# Patient Record
Sex: MALE | Race: WHITE | Employment: OTHER | URBAN - NONMETROPOLITAN AREA
[De-identification: names, ages, dates, MRNs, and addresses within clinical notes are randomized per-mention and may not be internally consistent; named-entity substitution may affect disease eponyms.]

---

## 2017-03-17 ENCOUNTER — OFFICE VISIT (OUTPATIENT)
Dept: FAMILY MEDICINE CLINIC | Age: 62
End: 2017-03-17

## 2017-03-17 VITALS
DIASTOLIC BLOOD PRESSURE: 90 MMHG | WEIGHT: 226 LBS | HEART RATE: 76 BPM | HEIGHT: 72 IN | BODY MASS INDEX: 30.61 KG/M2 | SYSTOLIC BLOOD PRESSURE: 160 MMHG

## 2017-03-17 DIAGNOSIS — M10.00 IDIOPATHIC GOUT, UNSPECIFIED CHRONICITY, UNSPECIFIED SITE: ICD-10-CM

## 2017-03-17 DIAGNOSIS — E11.9 TYPE 2 DIABETES MELLITUS WITHOUT COMPLICATION, WITHOUT LONG-TERM CURRENT USE OF INSULIN (HCC): ICD-10-CM

## 2017-03-17 DIAGNOSIS — I10 ESSENTIAL HYPERTENSION: ICD-10-CM

## 2017-03-17 PROCEDURE — 99213 OFFICE O/P EST LOW 20 MIN: CPT | Performed by: FAMILY MEDICINE

## 2017-03-17 RX ORDER — METOPROLOL SUCCINATE 100 MG/1
100 TABLET, EXTENDED RELEASE ORAL DAILY
Qty: 90 TABLET | Refills: 1 | Status: SHIPPED | OUTPATIENT
Start: 2017-03-17 | End: 2017-09-13 | Stop reason: SDUPTHER

## 2017-03-17 RX ORDER — INDOMETHACIN 50 MG/1
50 CAPSULE ORAL 2 TIMES DAILY WITH MEALS
Qty: 30 CAPSULE | Refills: 3 | Status: SHIPPED | OUTPATIENT
Start: 2017-03-17 | End: 2018-02-03 | Stop reason: SDUPTHER

## 2017-03-17 RX ORDER — ATORVASTATIN CALCIUM 40 MG/1
40 TABLET, FILM COATED ORAL DAILY
Qty: 90 TABLET | Refills: 1 | Status: SHIPPED | OUTPATIENT
Start: 2017-03-17 | End: 2017-09-25 | Stop reason: SDUPTHER

## 2017-03-17 RX ORDER — VALSARTAN AND HYDROCHLOROTHIAZIDE 160; 25 MG/1; MG/1
1 TABLET ORAL DAILY
Qty: 90 TABLET | Refills: 1 | Status: SHIPPED | OUTPATIENT
Start: 2017-03-17 | End: 2017-09-13 | Stop reason: SDUPTHER

## 2017-03-17 ASSESSMENT — ENCOUNTER SYMPTOMS
SHORTNESS OF BREATH: 0
ORTHOPNEA: 0
GASTROINTESTINAL NEGATIVE: 1
RESPIRATORY NEGATIVE: 1

## 2017-04-04 LAB
ALBUMIN SERPL-MCNC: 4.1 G/DL (ref 3.2–5.3)
ALK PHOSPHATASE: 81 IU/L (ref 35–121)
ALT SERPL-CCNC: 24 IU/L (ref 5–59)
ANION GAP SERPL CALCULATED.3IONS-SCNC: 10 MMOL/L
AST SERPL-CCNC: 16 IU/L (ref 10–42)
BILIRUB SERPL-MCNC: 0.6 MG/DL (ref 0.2–1.3)
BUN BLDV-MCNC: 16 MG/DL (ref 10–20)
CALCIUM SERPL-MCNC: 9 MG/DL (ref 8.7–10.8)
CHLORIDE BLD-SCNC: 104 MMOL/L (ref 95–111)
CHOLESTEROL/HDL RATIO: 4.1
CHOLESTEROL: 106 MG/DL
CO2: 31 MMOL/L (ref 21–32)
CREAT SERPL-MCNC: 1.1 MG/DL (ref 0.5–1.3)
CREATINE, URINE: 155.7 MG/DL
EGFR AFRICAN AMERICAN: 82
EGFR IF NONAFRICAN AMERICAN: 68
GLUCOSE: 121 MG/DL (ref 70–100)
HDLC SERPL-MCNC: 26 MG/DL (ref 40–60)
LDL CHOLESTEROL CALCULATED: 50 MG/DL
LDL/HDL RATIO: 1.9
MICROALBUMIN/CREAT 24H UR: 4.5 MG/DL
MICROALBUMIN/CREAT UR-RTO: 29 UG/MG
POTASSIUM SERPL-SCNC: 4.2 MMOL/L (ref 3.5–5.4)
SODIUM BLD-SCNC: 141 MMOL/L (ref 134–147)
TOTAL PROTEIN: 6.5 G/DL (ref 5.8–8)
TRIGL SERPL-MCNC: 151 MG/DL
VLDLC SERPL CALC-MCNC: 30 MG/DL

## 2017-04-05 LAB
AVERAGE GLUCOSE: 114 MG/DL (ref 66–114)
HBA1C MFR BLD: 5.6 % (ref 4.2–5.8)

## 2017-04-06 ENCOUNTER — TELEPHONE (OUTPATIENT)
Dept: FAMILY MEDICINE CLINIC | Age: 62
End: 2017-04-06

## 2017-09-13 DIAGNOSIS — I10 ESSENTIAL HYPERTENSION: ICD-10-CM

## 2017-09-13 RX ORDER — VALSARTAN AND HYDROCHLOROTHIAZIDE 160; 25 MG/1; MG/1
TABLET ORAL
Qty: 90 TABLET | Refills: 1 | Status: SHIPPED | OUTPATIENT
Start: 2017-09-13 | End: 2018-03-16 | Stop reason: SDUPTHER

## 2017-09-13 RX ORDER — METOPROLOL SUCCINATE 100 MG/1
TABLET, EXTENDED RELEASE ORAL
Qty: 90 TABLET | Refills: 1 | Status: SHIPPED | OUTPATIENT
Start: 2017-09-13 | End: 2018-03-16 | Stop reason: SDUPTHER

## 2017-09-25 ENCOUNTER — OFFICE VISIT (OUTPATIENT)
Dept: FAMILY MEDICINE CLINIC | Age: 62
End: 2017-09-25
Payer: COMMERCIAL

## 2017-09-25 VITALS
BODY MASS INDEX: 30.42 KG/M2 | WEIGHT: 224.6 LBS | DIASTOLIC BLOOD PRESSURE: 96 MMHG | HEART RATE: 68 BPM | HEIGHT: 72 IN | SYSTOLIC BLOOD PRESSURE: 152 MMHG

## 2017-09-25 DIAGNOSIS — I10 ESSENTIAL HYPERTENSION: ICD-10-CM

## 2017-09-25 DIAGNOSIS — E11.9 TYPE 2 DIABETES MELLITUS WITHOUT COMPLICATION, WITHOUT LONG-TERM CURRENT USE OF INSULIN (HCC): ICD-10-CM

## 2017-09-25 PROCEDURE — 99213 OFFICE O/P EST LOW 20 MIN: CPT | Performed by: FAMILY MEDICINE

## 2017-09-25 RX ORDER — ATORVASTATIN CALCIUM 40 MG/1
40 TABLET, FILM COATED ORAL DAILY
Qty: 90 TABLET | Refills: 1 | Status: SHIPPED | OUTPATIENT
Start: 2017-09-25 | End: 2018-03-16 | Stop reason: SDUPTHER

## 2017-09-25 ASSESSMENT — ENCOUNTER SYMPTOMS
GASTROINTESTINAL NEGATIVE: 1
RESPIRATORY NEGATIVE: 1
SHORTNESS OF BREATH: 0

## 2018-02-03 DIAGNOSIS — M10.00 IDIOPATHIC GOUT, UNSPECIFIED CHRONICITY, UNSPECIFIED SITE: ICD-10-CM

## 2018-02-05 RX ORDER — INDOMETHACIN 50 MG/1
CAPSULE ORAL
Qty: 30 CAPSULE | Refills: 2 | Status: SHIPPED | OUTPATIENT
Start: 2018-02-05 | End: 2019-02-06 | Stop reason: SDUPTHER

## 2018-02-05 NOTE — TELEPHONE ENCOUNTER
lJ Curry called requesting a refill on the following medications:  Requested Prescriptions     Pending Prescriptions Disp Refills    indomethacin (INDOCIN) 50 MG capsule [Pharmacy Med Name: Indomethacin Oral Capsule 50 MG] 30 capsule 2     Sig: TAKE 1 CAPSULE BY MOUTH TWO TIMES A DAY WITH MEALS       Date of last visit: Visit date not found  Date of next visit (if applicable):3/26/2018  Date of last refill: 03/2017  Pharmacy Name: [facility],  [address] [place] [address] Raliegh Simmonds

## 2018-03-16 ENCOUNTER — OFFICE VISIT (OUTPATIENT)
Dept: FAMILY MEDICINE CLINIC | Age: 63
End: 2018-03-16
Payer: COMMERCIAL

## 2018-03-16 VITALS
SYSTOLIC BLOOD PRESSURE: 154 MMHG | WEIGHT: 227.2 LBS | HEART RATE: 68 BPM | BODY MASS INDEX: 30.77 KG/M2 | DIASTOLIC BLOOD PRESSURE: 90 MMHG | HEIGHT: 72 IN

## 2018-03-16 DIAGNOSIS — I10 ESSENTIAL HYPERTENSION: ICD-10-CM

## 2018-03-16 DIAGNOSIS — M10.00 IDIOPATHIC GOUT, UNSPECIFIED CHRONICITY, UNSPECIFIED SITE: ICD-10-CM

## 2018-03-16 DIAGNOSIS — E11.9 TYPE 2 DIABETES MELLITUS WITHOUT COMPLICATION, WITHOUT LONG-TERM CURRENT USE OF INSULIN (HCC): Primary | ICD-10-CM

## 2018-03-16 LAB
ALBUMIN SERPL-MCNC: 4 G/DL (ref 3.5–5.1)
ALP BLD-CCNC: 76 U/L (ref 38–126)
ALT SERPL-CCNC: 35 U/L (ref 11–66)
ANION GAP SERPL CALCULATED.3IONS-SCNC: 17 MEQ/L (ref 8–16)
AST SERPL-CCNC: 18 U/L (ref 5–40)
AVERAGE GLUCOSE: 126 MG/DL (ref 70–126)
BILIRUB SERPL-MCNC: 0.4 MG/DL (ref 0.3–1.2)
BUN BLDV-MCNC: 18 MG/DL (ref 7–22)
CALCIUM SERPL-MCNC: 9.2 MG/DL (ref 8.5–10.5)
CHLORIDE BLD-SCNC: 101 MEQ/L (ref 98–111)
CHOLESTEROL, TOTAL: 128 MG/DL (ref 100–199)
CO2: 24 MEQ/L (ref 23–33)
CREAT SERPL-MCNC: 1 MG/DL (ref 0.4–1.2)
CREATININE, URINE: 80.7 MG/DL
GFR SERPL CREATININE-BSD FRML MDRD: 76 ML/MIN/1.73M2
GLUCOSE BLD-MCNC: 153 MG/DL (ref 70–108)
HBA1C MFR BLD: 6.2 % (ref 4.4–6.4)
HDLC SERPL-MCNC: 26 MG/DL
LDL CHOLESTEROL CALCULATED: 65 MG/DL
MICROALBUMIN UR-MCNC: 3.19 MG/DL
MICROALBUMIN/CREAT UR-RTO: 40 MG/G (ref 0–30)
POTASSIUM SERPL-SCNC: 4 MEQ/L (ref 3.5–5.2)
SODIUM BLD-SCNC: 142 MEQ/L (ref 135–145)
TOTAL PROTEIN: 6.8 G/DL (ref 6.1–8)
TRIGL SERPL-MCNC: 185 MG/DL (ref 0–199)

## 2018-03-16 PROCEDURE — 99213 OFFICE O/P EST LOW 20 MIN: CPT | Performed by: FAMILY MEDICINE

## 2018-03-16 PROCEDURE — 36415 COLL VENOUS BLD VENIPUNCTURE: CPT | Performed by: FAMILY MEDICINE

## 2018-03-16 RX ORDER — METOPROLOL SUCCINATE 100 MG/1
100 TABLET, EXTENDED RELEASE ORAL DAILY
Qty: 90 TABLET | Refills: 1 | Status: SHIPPED | OUTPATIENT
Start: 2018-03-16 | End: 2018-09-17 | Stop reason: SDUPTHER

## 2018-03-16 RX ORDER — VALSARTAN AND HYDROCHLOROTHIAZIDE 160; 25 MG/1; MG/1
1 TABLET ORAL DAILY
Qty: 90 TABLET | Refills: 1 | Status: SHIPPED | OUTPATIENT
Start: 2018-03-16 | End: 2018-09-17 | Stop reason: SDUPTHER

## 2018-03-16 RX ORDER — AMLODIPINE BESYLATE 5 MG/1
5 TABLET ORAL DAILY
Qty: 90 TABLET | Refills: 1 | Status: SHIPPED | OUTPATIENT
Start: 2018-03-16 | End: 2018-09-05 | Stop reason: SDUPTHER

## 2018-03-16 RX ORDER — ATORVASTATIN CALCIUM 40 MG/1
40 TABLET, FILM COATED ORAL DAILY
Qty: 90 TABLET | Refills: 1 | Status: SHIPPED | OUTPATIENT
Start: 2018-03-16 | End: 2018-09-05 | Stop reason: SDUPTHER

## 2018-03-16 ASSESSMENT — ENCOUNTER SYMPTOMS
RESPIRATORY NEGATIVE: 1
GASTROINTESTINAL NEGATIVE: 1
SHORTNESS OF BREATH: 0
ORTHOPNEA: 0

## 2018-03-16 ASSESSMENT — PATIENT HEALTH QUESTIONNAIRE - PHQ9
SUM OF ALL RESPONSES TO PHQ9 QUESTIONS 1 & 2: 0
1. LITTLE INTEREST OR PLEASURE IN DOING THINGS: 0
2. FEELING DOWN, DEPRESSED OR HOPELESS: 0
SUM OF ALL RESPONSES TO PHQ QUESTIONS 1-9: 0

## 2018-03-16 NOTE — PROGRESS NOTES
distal pulses. Exam reveals no gallop and no friction rub. No murmur heard. Pulmonary/Chest: Effort normal and breath sounds normal.   Abdominal: Soft. He exhibits no mass. There is no splenomegaly or hepatomegaly. There is no tenderness. Musculoskeletal: He exhibits no edema or tenderness. Lymphadenopathy:     He has no cervical adenopathy. Neurological: He is alert and oriented to person, place, and time. Ambulatory. No tremors. Skin: Skin is warm and dry. No rash noted. Psychiatric: He has a normal mood and affect. Vitals reviewed. Assessment:      1. Type 2 diabetes mellitus without complication, without long-term current use of insulin ([facility])  metFORMIN (GLUCOPHAGE) 500 MG tablet    atorvastatin (LIPITOR) 40 MG tablet    Comprehensive Metabolic Panel    Lipid Panel    Hemoglobin A1C    Albumin, Random Urine    Comprehensive Metabolic Panel    Lipid Panel    Hemoglobin A1C   2. Idiopathic gout, unspecified chronicity, unspecified site     3. Essential hypertension  valsartan-hydrochlorothiazide (DIOVAN-HCT) 160-25 MG per tablet    metoprolol succinate (TOPROL XL) 100 MG extended release tablet    Comprehensive Metabolic Panel    Lipid Panel    Hemoglobin A1C    Albumin, Random Urine    Comprehensive Metabolic Panel    Lipid Panel    Hemoglobin A1C       Plan: Will plan sleep study when job travel more stable. Add Norvasc. Return in about 6 months (around 9/16/2018) for DM, check-up.     Orders Placed:  Orders Placed This Encounter   Procedures    Comprehensive Metabolic Panel     Standing Status:   Future     Number of Occurrences:   1     Standing Expiration Date:   3/16/2019    Lipid Panel     Standing Status:   Future     Number of Occurrences:   1     Standing Expiration Date:   3/16/2019     Order Specific Question:   Is Patient Fasting?/# of Hours     Answer:   12 hours    Hemoglobin A1C     Standing Status:   Future     Number of Occurrences:   1     Standing Expiration Date:   3/16/2019     Medications Prescribed:  Orders Placed This Encounter   Medications    metFORMIN (GLUCOPHAGE) 500 MG tablet     Sig: Take 1 tablet by mouth 2 times daily (with meals)     Dispense:  180 tablet     Refill:  1    atorvastatin (LIPITOR) 40 MG tablet     Sig: Take 1 tablet by mouth daily     Dispense:  90 tablet     Refill:  1    valsartan-hydrochlorothiazide (DIOVAN-HCT) 160-25 MG per tablet     Sig: Take 1 tablet by mouth daily     Dispense:  90 tablet     Refill:  1    metoprolol succinate (TOPROL XL) 100 MG extended release tablet     Sig: Take 1 tablet by mouth daily     Dispense:  90 tablet     Refill:  1    amLODIPine (NORVASC) 5 MG tablet     Sig: Take 1 tablet by mouth daily     Dispense:  90 tablet     Refill:  1         Electronically signed by Jody Jacob MD on 3/16/2018 at 9:10 AM

## 2018-03-19 ENCOUNTER — TELEPHONE (OUTPATIENT)
Dept: FAMILY MEDICINE CLINIC | Age: 63
End: 2018-03-19

## 2018-08-14 ENCOUNTER — OFFICE VISIT (OUTPATIENT)
Dept: FAMILY MEDICINE CLINIC | Age: 63
End: 2018-08-14
Payer: COMMERCIAL

## 2018-08-14 VITALS
SYSTOLIC BLOOD PRESSURE: 138 MMHG | HEART RATE: 88 BPM | BODY MASS INDEX: 29.08 KG/M2 | TEMPERATURE: 97.9 F | DIASTOLIC BLOOD PRESSURE: 88 MMHG | WEIGHT: 214.4 LBS

## 2018-08-14 DIAGNOSIS — E11.9 TYPE 2 DIABETES MELLITUS WITHOUT COMPLICATION, WITHOUT LONG-TERM CURRENT USE OF INSULIN (HCC): ICD-10-CM

## 2018-08-14 DIAGNOSIS — L03.115 CELLULITIS OF RIGHT LOWER LEG: Primary | ICD-10-CM

## 2018-08-14 PROCEDURE — 99213 OFFICE O/P EST LOW 20 MIN: CPT | Performed by: FAMILY MEDICINE

## 2018-08-14 RX ORDER — CEPHALEXIN 500 MG/1
500 CAPSULE ORAL 3 TIMES DAILY
Qty: 30 CAPSULE | Refills: 0 | Status: SHIPPED | OUTPATIENT
Start: 2018-08-14 | End: 2018-09-17 | Stop reason: ALTCHOICE

## 2018-08-14 RX ORDER — SULFAMETHOXAZOLE AND TRIMETHOPRIM 800; 160 MG/1; MG/1
1 TABLET ORAL 2 TIMES DAILY
Qty: 20 TABLET | Refills: 0 | Status: SHIPPED | OUTPATIENT
Start: 2018-08-14 | End: 2018-08-24

## 2018-08-14 ASSESSMENT — ENCOUNTER SYMPTOMS
SHORTNESS OF BREATH: 0
WHEEZING: 0

## 2018-08-14 NOTE — PROGRESS NOTES
[facility] PROFESSIONAL SERVS  [facility]  [address] [address]  Dept: [phone]  Dept Fax: [phone]  Loc: [phone]  PROGRESS NOTE      Visit Date: 8/14/2018    rEica Kraft is a 58 y.o. male who presents today for:  Chief Complaint   Patient presents with    Rash     right leg, started after he had rubbed open a small area with new boots    Edema     right leg    Chills     Friday night       Subjective:  HPI     Right lower leg for about 4-5 days. He rubbed open a small scab that he got from his new boots. He has had worsening redness. He denies fevers  chills only on 1 day. He reports just a bit of numbness in his great toe. Diabetes: Fasting glucose levels are good and running about 120-150. Review of Systems   Constitutional: Negative for fever. Respiratory: Negative for shortness of breath and wheezing. Past Medical History:   Diagnosis Date    Diabetes mellitus ([address]     Gout     Hyperlipidemia     Hypertension     Type II or unspecified type diabetes mellitus without mention of complication, not stated as uncontrolled       Current Outpatient Prescriptions   Medication Sig Dispense Refill    metFORMIN (GLUCOPHAGE) 500 MG tablet Take 1 tablet by mouth 2 times daily (with meals) 180 tablet 1    atorvastatin (LIPITOR) 40 MG tablet Take 1 tablet by mouth daily 90 tablet 1    valsartan-hydrochlorothiazide (DIOVAN-HCT) 160-25 MG per tablet Take 1 tablet by mouth daily 90 tablet 1    metoprolol succinate (TOPROL XL) 100 MG extended release tablet Take 1 tablet by mouth daily 90 tablet 1    amLODIPine (NORVASC) 5 MG tablet Take 1 tablet by mouth daily 90 tablet 1    indomethacin (INDOCIN) 50 MG capsule TAKE 1 CAPSULE BY MOUTH TWO TIMES A DAY WITH MEALS 30 capsule 2    aspirin 81 MG tablet Take 1 tablet by mouth daily. 1 tablet 0     No current facility-administered medications for this visit.       Allergies Allergen Reactions    Crestor [Rosuvastatin] Other (See Comments)     Decreased HDL    Pcn [Penicillins] Rash       Objective:     /88 (Site: Left Arm, Position: Sitting, Cuff Size: Medium Adult)   Pulse 88   Temp 97.9 °F (36.6 °C) (Oral)   Wt 214 lb 6.4 oz (97.3 kg)   BMI 29.08 kg/m²   Physical Exam   Constitutional: He is oriented to person, place, and time. He appears well-developed and well-nourished. No distress. Cardiovascular: Normal rate and regular rhythm. No murmur heard. Pulmonary/Chest: Effort normal and breath sounds normal. No respiratory distress. He has no wheezes. Neurological: He is alert and oriented to person, place, and time. Psychiatric: He has a normal mood and affect. His behavior is normal.   Vitals reviewed. Right lower leg: Erythema of the entire anterior shin. One small open skin lesion. No drainage. No fluctuance of the soft tissues. Distal pulses and sensation are intact to light touch. Calf is soft and nontender. Negative Homans sign. Impression/Plan:  1. Cellulitis of right lower leg  Cellulitis of right lower leg. No evidence of abscess. No systemic symptoms. We will treat with Bactrim and Keflex for broad bacterial coverage.  -Return in 2 days if not improving or progresses. - sulfamethoxazole-trimethoprim (BACTRIM DS) 800-160 MG per tablet; Take 1 tablet by mouth 2 times daily for 10 days  Dispense: 20 tablet; Refill: 0  - cephALEXin (KEFLEX) 500 MG capsule; Take 1 capsule by mouth 3 times daily  Dispense: 30 capsule; Refill: 0    2. Type 2 diabetes mellitus without complication, without long-term current use of insulin ([facility])  Controlled glucose levels. They voiced understanding. All questions answered. They agreed with treatment plan. See patient instructions for any educational materials that may have been given. Discussed use, benefit, and side effects of prescribed medications.        (Please note that portions of this note may have been completed with a voice recognition program.  Efforts were made to edit the dictation but occasionally words are mis-transcribed.)    Return if symptoms worsen or fail to improve.        Electronically signed by Jerson Jakc MD on 8/14/2018 at 8:02 AM

## 2018-09-05 DIAGNOSIS — E11.9 TYPE 2 DIABETES MELLITUS WITHOUT COMPLICATION, WITHOUT LONG-TERM CURRENT USE OF INSULIN (HCC): ICD-10-CM

## 2018-09-05 RX ORDER — AMLODIPINE BESYLATE 5 MG/1
TABLET ORAL
Qty: 30 TABLET | Refills: 0 | Status: SHIPPED | OUTPATIENT
Start: 2018-09-05 | End: 2018-09-17 | Stop reason: SDUPTHER

## 2018-09-05 RX ORDER — ATORVASTATIN CALCIUM 40 MG/1
TABLET, FILM COATED ORAL
Qty: 90 TABLET | Refills: 0 | Status: SHIPPED | OUTPATIENT
Start: 2018-09-05 | End: 2018-09-17 | Stop reason: SDUPTHER

## 2018-09-17 ENCOUNTER — OFFICE VISIT (OUTPATIENT)
Dept: FAMILY MEDICINE CLINIC | Age: 63
End: 2018-09-17
Payer: COMMERCIAL

## 2018-09-17 VITALS
WEIGHT: 218 LBS | SYSTOLIC BLOOD PRESSURE: 154 MMHG | HEART RATE: 78 BPM | DIASTOLIC BLOOD PRESSURE: 88 MMHG | HEIGHT: 72 IN | BODY MASS INDEX: 29.53 KG/M2

## 2018-09-17 DIAGNOSIS — I10 ESSENTIAL HYPERTENSION: ICD-10-CM

## 2018-09-17 DIAGNOSIS — M10.00 IDIOPATHIC GOUT, UNSPECIFIED CHRONICITY, UNSPECIFIED SITE: ICD-10-CM

## 2018-09-17 DIAGNOSIS — E11.9 TYPE 2 DIABETES MELLITUS WITHOUT COMPLICATION, WITHOUT LONG-TERM CURRENT USE OF INSULIN (HCC): ICD-10-CM

## 2018-09-17 PROCEDURE — 99213 OFFICE O/P EST LOW 20 MIN: CPT | Performed by: FAMILY MEDICINE

## 2018-09-17 RX ORDER — METOPROLOL SUCCINATE 100 MG/1
100 TABLET, EXTENDED RELEASE ORAL DAILY
Qty: 90 TABLET | Refills: 1 | Status: SHIPPED | OUTPATIENT
Start: 2018-09-17 | End: 2019-03-18 | Stop reason: SDUPTHER

## 2018-09-17 RX ORDER — VALSARTAN AND HYDROCHLOROTHIAZIDE 160; 25 MG/1; MG/1
1 TABLET ORAL DAILY
Qty: 90 TABLET | Refills: 1 | Status: SHIPPED | OUTPATIENT
Start: 2018-09-17 | End: 2019-03-18 | Stop reason: SDUPTHER

## 2018-09-17 RX ORDER — ATORVASTATIN CALCIUM 40 MG/1
TABLET, FILM COATED ORAL
Qty: 90 TABLET | Refills: 0 | Status: SHIPPED | OUTPATIENT
Start: 2018-09-17 | End: 2019-03-18 | Stop reason: SDUPTHER

## 2018-09-17 RX ORDER — AMLODIPINE BESYLATE 5 MG/1
TABLET ORAL
Qty: 90 TABLET | Refills: 1 | Status: SHIPPED | OUTPATIENT
Start: 2018-09-17 | End: 2019-03-18 | Stop reason: SDUPTHER

## 2018-09-17 ASSESSMENT — ENCOUNTER SYMPTOMS
SHORTNESS OF BREATH: 0
RESPIRATORY NEGATIVE: 1
GASTROINTESTINAL NEGATIVE: 1

## 2018-09-17 NOTE — PROGRESS NOTES
Negative for dysphoric mood. Objective:     BP (!) 154/88 (Site: Left Upper Arm, Position: Sitting, Cuff Size: Large Adult)   Pulse 78   Ht 6' (1.829 m)   Wt 218 lb (98.9 kg)   BMI 29.57 kg/m²     Physical Exam   Constitutional: He is oriented to person, place, and time. He appears well-developed and well-nourished. Neck: Normal range of motion. Neck supple. No thyromegaly present. Cardiovascular: Normal rate, regular rhythm and normal heart sounds. Exam reveals no gallop and no friction rub. No murmur heard. Pulmonary/Chest: Effort normal and breath sounds normal.   Abdominal: Soft. He exhibits no mass. There is no splenomegaly or hepatomegaly. There is no tenderness. Musculoskeletal: He exhibits no edema or tenderness. Lymphadenopathy:     He has no cervical adenopathy. Neurological: He is alert and oriented to person, place, and time. Ambulatory. No tremors. Skin: Skin is warm and dry. No rash noted. Psychiatric: He has a normal mood and affect. Vitals reviewed. Assessment:       Diagnosis Orders   1. Type 2 diabetes mellitus without complication, without long-term current use of insulin ([facility])  metFORMIN (GLUCOPHAGE) 500 MG tablet    atorvastatin (LIPITOR) 40 MG tablet   2. Essential hypertension  valsartan-hydrochlorothiazide (DIOVAN-HCT) 160-25 MG per tablet    metoprolol succinate (TOPROL XL) 100 MG extended release tablet   3. Idiopathic gout, unspecified chronicity, unspecified site         Plan:      Return in about 6 months (around 3/17/2019) for HTN, check-up. Orders Placed:  No orders of the defined types were placed in this encounter.     Medications Prescribed:  Orders Placed This Encounter   Medications    metFORMIN (GLUCOPHAGE) 500 MG tablet     Sig: TAKE 1 TABLET BY MOUTH TWO TIMES A DAY WITH MEALS     Dispense:  180 tablet     Refill:  0    atorvastatin (LIPITOR) 40 MG tablet     Sig: TAKE 1 TABLET BY MOUTH ONE TIME A DAY     Dispense:  90 tablet Refill:  0    amLODIPine (NORVASC) 5 MG tablet     Sig: TAKE 1 TABLET BY MOUTH ONE TIME A DAY     Dispense:  90 tablet     Refill:  1    valsartan-hydrochlorothiazide (DIOVAN-HCT) 160-25 MG per tablet     Sig: Take 1 tablet by mouth daily     Dispense:  90 tablet     Refill:  1    metoprolol succinate (TOPROL XL) 100 MG extended release tablet     Sig: Take 1 tablet by mouth daily     Dispense:  90 tablet     Refill:  1            Electronically signed by Fariba Calix MD on 9/17/2018 at 8:22 AM

## 2019-02-06 DIAGNOSIS — M10.00 IDIOPATHIC GOUT, UNSPECIFIED CHRONICITY, UNSPECIFIED SITE: ICD-10-CM

## 2019-02-06 RX ORDER — INDOMETHACIN 50 MG/1
CAPSULE ORAL
Qty: 180 CAPSULE | Refills: 1 | Status: SHIPPED | OUTPATIENT
Start: 2019-02-06 | End: 2019-09-27 | Stop reason: SDUPTHER

## 2019-03-18 ENCOUNTER — OFFICE VISIT (OUTPATIENT)
Dept: FAMILY MEDICINE CLINIC | Age: 64
End: 2019-03-18
Payer: COMMERCIAL

## 2019-03-18 ENCOUNTER — HOSPITAL ENCOUNTER (OUTPATIENT)
Age: 64
Discharge: HOME | End: 2019-03-18
Payer: COMMERCIAL

## 2019-03-18 VITALS
DIASTOLIC BLOOD PRESSURE: 92 MMHG | HEART RATE: 70 BPM | BODY MASS INDEX: 30.8 KG/M2 | WEIGHT: 227.4 LBS | SYSTOLIC BLOOD PRESSURE: 160 MMHG | HEIGHT: 72 IN

## 2019-03-18 DIAGNOSIS — I10 ESSENTIAL HYPERTENSION: ICD-10-CM

## 2019-03-18 DIAGNOSIS — E11.9 TYPE 2 DIABETES MELLITUS WITHOUT COMPLICATION, WITHOUT LONG-TERM CURRENT USE OF INSULIN (HCC): ICD-10-CM

## 2019-03-18 LAB
ALBUMIN SERPL-MCNC: 4.2 G/DL (ref 3.5–5.1)
ALP BLD-CCNC: 79 U/L (ref 38–126)
ALT SERPL-CCNC: 44 U/L (ref 11–66)
ANION GAP SERPL CALCULATED.3IONS-SCNC: 14 MEQ/L (ref 8–16)
AST SERPL-CCNC: 25 U/L (ref 5–40)
AVERAGE GLUCOSE: 132 MG/DL (ref 70–126)
BILIRUB SERPL-MCNC: 0.4 MG/DL (ref 0.3–1.2)
BUN BLDV-MCNC: 18 MG/DL (ref 7–22)
CALCIUM SERPL-MCNC: 10.1 MG/DL (ref 8.5–10.5)
CHLORIDE BLD-SCNC: 105 MEQ/L (ref 98–111)
CHOLESTEROL, TOTAL: 140 MG/DL (ref 100–199)
CO2: 24 MEQ/L (ref 23–33)
CREAT SERPL-MCNC: 1 MG/DL (ref 0.4–1.2)
EKG ATRIAL RATE: 59 BPM
EKG P AXIS: 8 DEGREES
EKG P-R INTERVAL: 154 MS
EKG Q-T INTERVAL: 386 MS
EKG QRS DURATION: 90 MS
EKG QTC CALCULATION (BAZETT): 382 MS
EKG R AXIS: 11 DEGREES
EKG T AXIS: 38 DEGREES
EKG VENTRICULAR RATE: 59 BPM
GFR SERPL CREATININE-BSD FRML MDRD: 75 ML/MIN/1.73M2
GLUCOSE BLD-MCNC: 163 MG/DL (ref 70–108)
HBA1C MFR BLD: 6.4 % (ref 4.4–6.4)
HDLC SERPL-MCNC: 27 MG/DL
LDL CHOLESTEROL CALCULATED: 76 MG/DL
POTASSIUM SERPL-SCNC: 4.1 MEQ/L (ref 3.5–5.2)
SODIUM BLD-SCNC: 143 MEQ/L (ref 135–145)
TOTAL PROTEIN: 7.3 G/DL (ref 6.1–8)
TRIGL SERPL-MCNC: 184 MG/DL (ref 0–199)

## 2019-03-18 PROCEDURE — 93005 ELECTROCARDIOGRAM TRACING: CPT | Performed by: FAMILY MEDICINE

## 2019-03-18 PROCEDURE — 99213 OFFICE O/P EST LOW 20 MIN: CPT | Performed by: FAMILY MEDICINE

## 2019-03-18 PROCEDURE — 93010 ELECTROCARDIOGRAM REPORT: CPT | Performed by: INTERNAL MEDICINE

## 2019-03-18 RX ORDER — ATORVASTATIN CALCIUM 40 MG/1
TABLET, FILM COATED ORAL
Qty: 90 TABLET | Refills: 0 | Status: SHIPPED | OUTPATIENT
Start: 2019-03-18 | End: 2019-06-17 | Stop reason: SDUPTHER

## 2019-03-18 RX ORDER — AMLODIPINE BESYLATE 5 MG/1
TABLET ORAL
Qty: 90 TABLET | Refills: 1 | Status: SHIPPED | OUTPATIENT
Start: 2019-03-18 | End: 2019-09-27 | Stop reason: SDUPTHER

## 2019-03-18 RX ORDER — METOPROLOL SUCCINATE 100 MG/1
100 TABLET, EXTENDED RELEASE ORAL DAILY
Qty: 90 TABLET | Refills: 1 | Status: SHIPPED | OUTPATIENT
Start: 2019-03-18 | End: 2019-09-27 | Stop reason: SDUPTHER

## 2019-03-18 RX ORDER — VALSARTAN AND HYDROCHLOROTHIAZIDE 160; 25 MG/1; MG/1
1 TABLET ORAL DAILY
Qty: 90 TABLET | Refills: 1 | Status: SHIPPED | OUTPATIENT
Start: 2019-03-18 | End: 2019-09-27 | Stop reason: SDUPTHER

## 2019-03-18 ASSESSMENT — PATIENT HEALTH QUESTIONNAIRE - PHQ9
SUM OF ALL RESPONSES TO PHQ9 QUESTIONS 1 & 2: 0
SUM OF ALL RESPONSES TO PHQ QUESTIONS 1-9: 0
SUM OF ALL RESPONSES TO PHQ QUESTIONS 1-9: 0
1. LITTLE INTEREST OR PLEASURE IN DOING THINGS: 0
2. FEELING DOWN, DEPRESSED OR HOPELESS: 0

## 2019-03-18 ASSESSMENT — ENCOUNTER SYMPTOMS
RESPIRATORY NEGATIVE: 1
GASTROINTESTINAL NEGATIVE: 1
ORTHOPNEA: 0

## 2019-03-19 ENCOUNTER — TELEPHONE (OUTPATIENT)
Dept: FAMILY MEDICINE CLINIC | Age: 64
End: 2019-03-19

## 2019-06-17 DIAGNOSIS — E11.9 TYPE 2 DIABETES MELLITUS WITHOUT COMPLICATION, WITHOUT LONG-TERM CURRENT USE OF INSULIN (HCC): ICD-10-CM

## 2019-06-17 RX ORDER — ATORVASTATIN CALCIUM 40 MG/1
TABLET, FILM COATED ORAL
Qty: 90 TABLET | Refills: 0 | Status: SHIPPED | OUTPATIENT
Start: 2019-06-17 | End: 2019-09-27 | Stop reason: SDUPTHER

## 2019-09-27 ENCOUNTER — OFFICE VISIT (OUTPATIENT)
Dept: FAMILY MEDICINE CLINIC | Age: 64
End: 2019-09-27
Payer: COMMERCIAL

## 2019-09-27 VITALS
HEIGHT: 72 IN | BODY MASS INDEX: 29.23 KG/M2 | DIASTOLIC BLOOD PRESSURE: 80 MMHG | WEIGHT: 215.8 LBS | SYSTOLIC BLOOD PRESSURE: 136 MMHG | HEART RATE: 63 BPM

## 2019-09-27 DIAGNOSIS — M10.00 IDIOPATHIC GOUT, UNSPECIFIED CHRONICITY, UNSPECIFIED SITE: ICD-10-CM

## 2019-09-27 DIAGNOSIS — I10 ESSENTIAL HYPERTENSION: ICD-10-CM

## 2019-09-27 DIAGNOSIS — E11.9 TYPE 2 DIABETES MELLITUS WITHOUT COMPLICATION, WITHOUT LONG-TERM CURRENT USE OF INSULIN (HCC): ICD-10-CM

## 2019-09-27 PROCEDURE — 99213 OFFICE O/P EST LOW 20 MIN: CPT | Performed by: FAMILY MEDICINE

## 2019-09-27 RX ORDER — METOPROLOL SUCCINATE 100 MG/1
100 TABLET, EXTENDED RELEASE ORAL DAILY
Qty: 90 TABLET | Refills: 3 | Status: SHIPPED | OUTPATIENT
Start: 2019-09-27 | End: 2020-03-17 | Stop reason: SDUPTHER

## 2019-09-27 RX ORDER — ATORVASTATIN CALCIUM 40 MG/1
40 TABLET, FILM COATED ORAL DAILY
Qty: 90 TABLET | Refills: 3 | Status: SHIPPED | OUTPATIENT
Start: 2019-09-27 | End: 2020-03-17 | Stop reason: SDUPTHER

## 2019-09-27 RX ORDER — VALSARTAN AND HYDROCHLOROTHIAZIDE 160; 25 MG/1; MG/1
1 TABLET ORAL DAILY
Qty: 90 TABLET | Refills: 3 | Status: SHIPPED | OUTPATIENT
Start: 2019-09-27 | End: 2020-03-17 | Stop reason: SDUPTHER

## 2019-09-27 RX ORDER — AMLODIPINE BESYLATE 5 MG/1
TABLET ORAL
Qty: 90 TABLET | Refills: 3 | Status: SHIPPED | OUTPATIENT
Start: 2019-09-27 | End: 2020-03-17 | Stop reason: SDUPTHER

## 2019-09-27 RX ORDER — INDOMETHACIN 50 MG/1
CAPSULE ORAL
Qty: 180 CAPSULE | Refills: 1 | Status: ON HOLD | OUTPATIENT
Start: 2019-09-27 | End: 2019-11-05 | Stop reason: HOSPADM

## 2019-09-27 ASSESSMENT — ENCOUNTER SYMPTOMS
SHORTNESS OF BREATH: 0
ORTHOPNEA: 0
RESPIRATORY NEGATIVE: 1
GASTROINTESTINAL NEGATIVE: 1

## 2019-11-05 ENCOUNTER — ANESTHESIA (OUTPATIENT)
Dept: ENDOSCOPY | Age: 64
End: 2019-11-05
Payer: COMMERCIAL

## 2019-11-05 ENCOUNTER — HOSPITAL ENCOUNTER (OUTPATIENT)
Age: 64
Setting detail: OUTPATIENT SURGERY
Discharge: HOME | End: 2019-11-05
Attending: INTERNAL MEDICINE | Admitting: INTERNAL MEDICINE
Payer: COMMERCIAL

## 2019-11-05 ENCOUNTER — ANESTHESIA EVENT (OUTPATIENT)
Dept: ENDOSCOPY | Age: 64
End: 2019-11-05
Payer: COMMERCIAL

## 2019-11-05 VITALS
OXYGEN SATURATION: 98 % | WEIGHT: 214.4 LBS | HEIGHT: 72 IN | TEMPERATURE: 97.2 F | SYSTOLIC BLOOD PRESSURE: 179 MMHG | RESPIRATION RATE: 20 BRPM | HEART RATE: 73 BPM | DIASTOLIC BLOOD PRESSURE: 95 MMHG | BODY MASS INDEX: 29.04 KG/M2

## 2019-11-05 VITALS
OXYGEN SATURATION: 99 % | DIASTOLIC BLOOD PRESSURE: 104 MMHG | RESPIRATION RATE: 17 BRPM | SYSTOLIC BLOOD PRESSURE: 175 MMHG

## 2019-11-05 PROCEDURE — 2709999900 HC NON-CHARGEABLE SUPPLY: Performed by: INTERNAL MEDICINE

## 2019-11-05 PROCEDURE — 7100000000 HC PACU RECOVERY - FIRST 15 MIN: Performed by: INTERNAL MEDICINE

## 2019-11-05 PROCEDURE — 88305 TISSUE EXAM BY PATHOLOGIST: CPT

## 2019-11-05 PROCEDURE — 6360000002 HC RX W HCPCS: Performed by: NURSE ANESTHETIST, CERTIFIED REGISTERED

## 2019-11-05 PROCEDURE — 3700000000 HC ANESTHESIA ATTENDED CARE: Performed by: INTERNAL MEDICINE

## 2019-11-05 PROCEDURE — 3700000001 HC ADD 15 MINUTES (ANESTHESIA): Performed by: INTERNAL MEDICINE

## 2019-11-05 PROCEDURE — 7100000001 HC PACU RECOVERY - ADDTL 15 MIN: Performed by: INTERNAL MEDICINE

## 2019-11-05 PROCEDURE — 3609010600 HC COLONOSCOPY POLYPECTOMY SNARE/COLD BIOPSY: Performed by: INTERNAL MEDICINE

## 2019-11-05 PROCEDURE — 2580000003 HC RX 258: Performed by: INTERNAL MEDICINE

## 2019-11-05 RX ORDER — SODIUM CHLORIDE 450 MG/100ML
INJECTION, SOLUTION INTRAVENOUS CONTINUOUS
Status: DISCONTINUED | OUTPATIENT
Start: 2019-11-05 | End: 2019-11-05 | Stop reason: HOSPADM

## 2019-11-05 RX ORDER — PROPOFOL 10 MG/ML
INJECTION, EMULSION INTRAVENOUS PRN
Status: DISCONTINUED | OUTPATIENT
Start: 2019-11-05 | End: 2019-11-05 | Stop reason: SDUPTHER

## 2019-11-05 RX ADMIN — PROPOFOL 20 MG: 10 INJECTION, EMULSION INTRAVENOUS at 09:30

## 2019-11-05 RX ADMIN — PROPOFOL 70 MG: 10 INJECTION, EMULSION INTRAVENOUS at 09:19

## 2019-11-05 RX ADMIN — PROPOFOL 50 MG: 10 INJECTION, EMULSION INTRAVENOUS at 09:26

## 2019-11-05 RX ADMIN — PROPOFOL 20 MG: 10 INJECTION, EMULSION INTRAVENOUS at 09:20

## 2019-11-05 RX ADMIN — PROPOFOL 50 MG: 10 INJECTION, EMULSION INTRAVENOUS at 09:37

## 2019-11-05 RX ADMIN — PROPOFOL 20 MG: 10 INJECTION, EMULSION INTRAVENOUS at 09:32

## 2019-11-05 RX ADMIN — PROPOFOL 50 MG: 10 INJECTION, EMULSION INTRAVENOUS at 09:34

## 2019-11-05 RX ADMIN — SODIUM CHLORIDE: 4.5 INJECTION, SOLUTION INTRAVENOUS at 07:41

## 2019-11-05 RX ADMIN — PROPOFOL 30 MG: 10 INJECTION, EMULSION INTRAVENOUS at 09:22

## 2019-11-05 RX ADMIN — PROPOFOL 20 MG: 10 INJECTION, EMULSION INTRAVENOUS at 09:28

## 2019-11-05 RX ADMIN — PROPOFOL 30 MG: 10 INJECTION, EMULSION INTRAVENOUS at 09:24

## 2019-11-05 ASSESSMENT — PAIN SCALES - GENERAL
PAINLEVEL_OUTOF10: 0
PAINLEVEL_OUTOF10: 0

## 2019-11-05 ASSESSMENT — PAIN - FUNCTIONAL ASSESSMENT: PAIN_FUNCTIONAL_ASSESSMENT: 0-10

## 2020-03-17 ENCOUNTER — TELEPHONE (OUTPATIENT)
Dept: FAMILY MEDICINE CLINIC | Age: 65
End: 2020-03-17

## 2020-03-17 RX ORDER — ATORVASTATIN CALCIUM 40 MG/1
40 TABLET, FILM COATED ORAL DAILY
Qty: 90 TABLET | Refills: 3 | Status: SHIPPED | OUTPATIENT
Start: 2020-03-17 | End: 2020-03-20 | Stop reason: SDUPTHER

## 2020-03-17 RX ORDER — AMLODIPINE BESYLATE 5 MG/1
TABLET ORAL
Qty: 90 TABLET | Refills: 3 | Status: SHIPPED | OUTPATIENT
Start: 2020-03-17 | End: 2020-03-20 | Stop reason: SDUPTHER

## 2020-03-17 RX ORDER — AMLODIPINE BESYLATE 5 MG/1
TABLET ORAL
Qty: 90 TABLET | Refills: 3 | Status: SHIPPED | OUTPATIENT
Start: 2020-03-17 | End: 2020-03-17 | Stop reason: SDUPTHER

## 2020-03-17 RX ORDER — METOPROLOL SUCCINATE 100 MG/1
100 TABLET, EXTENDED RELEASE ORAL DAILY
Qty: 90 TABLET | Refills: 3 | Status: SHIPPED | OUTPATIENT
Start: 2020-03-17 | End: 2020-03-20 | Stop reason: SDUPTHER

## 2020-03-17 RX ORDER — ATORVASTATIN CALCIUM 40 MG/1
40 TABLET, FILM COATED ORAL DAILY
Qty: 90 TABLET | Refills: 3 | Status: SHIPPED | OUTPATIENT
Start: 2020-03-17 | End: 2020-03-17 | Stop reason: SDUPTHER

## 2020-03-17 RX ORDER — VALSARTAN AND HYDROCHLOROTHIAZIDE 160; 25 MG/1; MG/1
1 TABLET ORAL DAILY
Qty: 90 TABLET | Refills: 3 | Status: SHIPPED | OUTPATIENT
Start: 2020-03-17 | End: 2020-03-20 | Stop reason: SDUPTHER

## 2020-03-17 NOTE — TELEPHONE ENCOUNTER
Darrell Gibbs. called requesting a refill on the following medications:  Requested Prescriptions     Pending Prescriptions Disp Refills    metFORMIN (GLUCOPHAGE) 500 MG tablet 180 tablet 3     Sig: Take 1 tablet by mouth 2 times daily (with meals)    atorvastatin (LIPITOR) 40 MG tablet 90 tablet 3     Sig: Take 1 tablet by mouth daily    amLODIPine (NORVASC) 5 MG tablet 90 tablet 3     Sig: TAKE 1 TABLET BY MOUTH ONE TIME A DAY       Date of last visit: 9/27/2019  Date of next visit (if applicable):Visit date not found  Date of last refill: 09/27/2019  Pharmacy Name: [facility] has changed to [address] for med refills.

## 2020-03-17 NOTE — TELEPHONE ENCOUNTER
Barbie called back in stating now that he needs the RX's sent in to [facility] instead of the [place] [id number]. I have them set for you to approve.

## 2020-03-20 RX ORDER — ATORVASTATIN CALCIUM 40 MG/1
40 TABLET, FILM COATED ORAL DAILY
Qty: 90 TABLET | Refills: 3 | Status: CANCELLED | OUTPATIENT
Start: 2020-03-20

## 2020-03-20 RX ORDER — AMLODIPINE BESYLATE 5 MG/1
TABLET ORAL
Qty: 90 TABLET | Refills: 3 | Status: SHIPPED | OUTPATIENT
Start: 2020-03-20 | End: 2020-11-16 | Stop reason: SDUPTHER

## 2020-03-20 RX ORDER — METOPROLOL SUCCINATE 100 MG/1
100 TABLET, EXTENDED RELEASE ORAL DAILY
Qty: 90 TABLET | Refills: 3 | Status: SHIPPED | OUTPATIENT
Start: 2020-03-20 | End: 2020-11-16 | Stop reason: SDUPTHER

## 2020-03-20 RX ORDER — VALSARTAN AND HYDROCHLOROTHIAZIDE 160; 25 MG/1; MG/1
1 TABLET ORAL DAILY
Qty: 90 TABLET | Refills: 3 | Status: CANCELLED | OUTPATIENT
Start: 2020-03-20

## 2020-03-20 RX ORDER — AMLODIPINE BESYLATE 5 MG/1
TABLET ORAL
Qty: 90 TABLET | Refills: 3 | Status: CANCELLED | OUTPATIENT
Start: 2020-03-20

## 2020-03-20 RX ORDER — METOPROLOL SUCCINATE 100 MG/1
100 TABLET, EXTENDED RELEASE ORAL DAILY
Qty: 90 TABLET | Refills: 3 | Status: CANCELLED | OUTPATIENT
Start: 2020-03-20

## 2020-03-20 RX ORDER — VALSARTAN AND HYDROCHLOROTHIAZIDE 160; 25 MG/1; MG/1
1 TABLET ORAL DAILY
Qty: 90 TABLET | Refills: 3 | Status: SHIPPED | OUTPATIENT
Start: 2020-03-20 | End: 2020-11-16 | Stop reason: SDUPTHER

## 2020-03-20 RX ORDER — ATORVASTATIN CALCIUM 40 MG/1
40 TABLET, FILM COATED ORAL DAILY
Qty: 90 TABLET | Refills: 3 | Status: SHIPPED | OUTPATIENT
Start: 2020-03-20 | End: 2020-11-16 | Stop reason: SDUPTHER

## 2020-05-28 ENCOUNTER — OFFICE VISIT (OUTPATIENT)
Dept: FAMILY MEDICINE CLINIC | Age: 65
End: 2020-05-28
Payer: COMMERCIAL

## 2020-05-28 VITALS
WEIGHT: 215 LBS | BODY MASS INDEX: 29.12 KG/M2 | TEMPERATURE: 98.7 F | SYSTOLIC BLOOD PRESSURE: 144 MMHG | DIASTOLIC BLOOD PRESSURE: 88 MMHG | HEART RATE: 64 BPM | HEIGHT: 72 IN

## 2020-05-28 PROCEDURE — 99214 OFFICE O/P EST MOD 30 MIN: CPT | Performed by: FAMILY MEDICINE

## 2020-05-28 SDOH — ECONOMIC STABILITY: TRANSPORTATION INSECURITY
IN THE PAST 12 MONTHS, HAS LACK OF TRANSPORTATION KEPT YOU FROM MEETINGS, WORK, OR FROM GETTING THINGS NEEDED FOR DAILY LIVING?: NO

## 2020-05-28 SDOH — ECONOMIC STABILITY: FOOD INSECURITY: WITHIN THE PAST 12 MONTHS, YOU WORRIED THAT YOUR FOOD WOULD RUN OUT BEFORE YOU GOT MONEY TO BUY MORE.: NEVER TRUE

## 2020-05-28 SDOH — ECONOMIC STABILITY: FOOD INSECURITY: WITHIN THE PAST 12 MONTHS, THE FOOD YOU BOUGHT JUST DIDN'T LAST AND YOU DIDN'T HAVE MONEY TO GET MORE.: NEVER TRUE

## 2020-05-28 SDOH — ECONOMIC STABILITY: TRANSPORTATION INSECURITY
IN THE PAST 12 MONTHS, HAS THE LACK OF TRANSPORTATION KEPT YOU FROM MEDICAL APPOINTMENTS OR FROM GETTING MEDICATIONS?: NO

## 2020-05-28 SDOH — ECONOMIC STABILITY: INCOME INSECURITY: HOW HARD IS IT FOR YOU TO PAY FOR THE VERY BASICS LIKE FOOD, HOUSING, MEDICAL CARE, AND HEATING?: NOT HARD AT ALL

## 2020-05-28 ASSESSMENT — PATIENT HEALTH QUESTIONNAIRE - PHQ9
2. FEELING DOWN, DEPRESSED OR HOPELESS: 0
1. LITTLE INTEREST OR PLEASURE IN DOING THINGS: 0
SUM OF ALL RESPONSES TO PHQ QUESTIONS 1-9: 0
SUM OF ALL RESPONSES TO PHQ QUESTIONS 1-9: 0
SUM OF ALL RESPONSES TO PHQ9 QUESTIONS 1 & 2: 0

## 2020-05-28 ASSESSMENT — ENCOUNTER SYMPTOMS
SHORTNESS OF BREATH: 0
COUGH: 0

## 2020-05-30 ENCOUNTER — HOSPITAL ENCOUNTER (OUTPATIENT)
Age: 65
Discharge: HOME | End: 2020-05-30
Payer: COMMERCIAL

## 2020-05-30 DIAGNOSIS — I10 ESSENTIAL HYPERTENSION: ICD-10-CM

## 2020-05-30 DIAGNOSIS — E11.9 TYPE 2 DIABETES MELLITUS WITHOUT COMPLICATION, WITHOUT LONG-TERM CURRENT USE OF INSULIN (HCC): ICD-10-CM

## 2020-05-30 DIAGNOSIS — E78.49 OTHER HYPERLIPIDEMIA: Chronic | ICD-10-CM

## 2020-05-30 LAB
ALBUMIN SERPL-MCNC: 3.9 G/DL (ref 3.5–5.1)
ALP BLD-CCNC: 74 U/L (ref 38–126)
ALT SERPL-CCNC: 33 U/L (ref 11–66)
ANION GAP SERPL CALCULATED.3IONS-SCNC: 12 MEQ/L (ref 8–16)
AST SERPL-CCNC: 26 U/L (ref 5–40)
AVERAGE GLUCOSE: 129 MG/DL (ref 70–126)
BILIRUB SERPL-MCNC: 0.5 MG/DL (ref 0.3–1.2)
BUN BLDV-MCNC: 22 MG/DL (ref 7–22)
CALCIUM SERPL-MCNC: 9 MG/DL (ref 8.5–10.5)
CHLORIDE BLD-SCNC: 104 MEQ/L (ref 98–111)
CHOLESTEROL, TOTAL: 128 MG/DL (ref 100–199)
CO2: 26 MEQ/L (ref 23–33)
CREAT SERPL-MCNC: 1.1 MG/DL (ref 0.4–1.2)
ERYTHROCYTE [DISTWIDTH] IN BLOOD BY AUTOMATED COUNT: 13.2 % (ref 11.5–14.5)
ERYTHROCYTE [DISTWIDTH] IN BLOOD BY AUTOMATED COUNT: 45 FL (ref 35–45)
GFR SERPL CREATININE-BSD FRML MDRD: 67 ML/MIN/1.73M2
GLUCOSE BLD-MCNC: 134 MG/DL (ref 70–108)
HBA1C MFR BLD: 6.3 % (ref 4.4–6.4)
HCT VFR BLD CALC: 43.8 % (ref 42–52)
HDLC SERPL-MCNC: 25 MG/DL
HEMOGLOBIN: 14.7 GM/DL (ref 14–18)
LDL CHOLESTEROL CALCULATED: 63 MG/DL
MCH RBC QN AUTO: 31.6 PG (ref 26–33)
MCHC RBC AUTO-ENTMCNC: 33.6 GM/DL (ref 32.2–35.5)
MCV RBC AUTO: 94.2 FL (ref 80–94)
PLATELET # BLD: 218 THOU/MM3 (ref 130–400)
PMV BLD AUTO: 11.4 FL (ref 9.4–12.4)
POTASSIUM SERPL-SCNC: 3.7 MEQ/L (ref 3.5–5.2)
RBC # BLD: 4.65 MILL/MM3 (ref 4.7–6.1)
SODIUM BLD-SCNC: 142 MEQ/L (ref 135–145)
TOTAL PROTEIN: 6.7 G/DL (ref 6.1–8)
TRIGL SERPL-MCNC: 202 MG/DL (ref 0–199)
WBC # BLD: 6.1 THOU/MM3 (ref 4.8–10.8)

## 2020-05-30 PROCEDURE — 36415 COLL VENOUS BLD VENIPUNCTURE: CPT

## 2020-05-30 PROCEDURE — 85027 COMPLETE CBC AUTOMATED: CPT

## 2020-05-30 PROCEDURE — 83036 HEMOGLOBIN GLYCOSYLATED A1C: CPT

## 2020-05-30 PROCEDURE — 80061 LIPID PANEL: CPT

## 2020-05-30 PROCEDURE — 80053 COMPREHEN METABOLIC PANEL: CPT

## 2020-06-01 ENCOUNTER — TELEPHONE (OUTPATIENT)
Dept: FAMILY MEDICINE CLINIC | Age: 65
End: 2020-06-01

## 2020-11-16 ENCOUNTER — HOSPITAL ENCOUNTER (OUTPATIENT)
Age: 65
Discharge: HOME | End: 2020-11-16
Payer: MEDICARE

## 2020-11-16 ENCOUNTER — OFFICE VISIT (OUTPATIENT)
Dept: FAMILY MEDICINE CLINIC | Age: 65
End: 2020-11-16
Payer: MEDICARE

## 2020-11-16 VITALS
BODY MASS INDEX: 29.58 KG/M2 | DIASTOLIC BLOOD PRESSURE: 88 MMHG | TEMPERATURE: 97 F | OXYGEN SATURATION: 97 % | HEIGHT: 72 IN | SYSTOLIC BLOOD PRESSURE: 140 MMHG | HEART RATE: 72 BPM | WEIGHT: 218.4 LBS

## 2020-11-16 DIAGNOSIS — E11.9 TYPE 2 DIABETES MELLITUS WITHOUT COMPLICATION, WITHOUT LONG-TERM CURRENT USE OF INSULIN (HCC): ICD-10-CM

## 2020-11-16 DIAGNOSIS — Z12.5 PROSTATE CANCER SCREENING: ICD-10-CM

## 2020-11-16 DIAGNOSIS — I10 ESSENTIAL HYPERTENSION: ICD-10-CM

## 2020-11-16 LAB
ALBUMIN SERPL-MCNC: 4.4 G/DL (ref 3.5–5.1)
ALP BLD-CCNC: 81 U/L (ref 38–126)
ALT SERPL-CCNC: 31 U/L (ref 11–66)
ANION GAP SERPL CALCULATED.3IONS-SCNC: 16 MEQ/L (ref 8–16)
AST SERPL-CCNC: 24 U/L (ref 5–40)
AVERAGE GLUCOSE: 150 MG/DL (ref 70–126)
BILIRUB SERPL-MCNC: 0.6 MG/DL (ref 0.3–1.2)
BUN BLDV-MCNC: 16 MG/DL (ref 7–22)
CALCIUM SERPL-MCNC: 9.9 MG/DL (ref 8.5–10.5)
CHLORIDE BLD-SCNC: 101 MEQ/L (ref 98–111)
CO2: 25 MEQ/L (ref 23–33)
CREAT SERPL-MCNC: 1.1 MG/DL (ref 0.4–1.2)
ERYTHROCYTE [DISTWIDTH] IN BLOOD BY AUTOMATED COUNT: 12.9 % (ref 11.5–14.5)
ERYTHROCYTE [DISTWIDTH] IN BLOOD BY AUTOMATED COUNT: 43.8 FL (ref 35–45)
GFR SERPL CREATININE-BSD FRML MDRD: 67 ML/MIN/1.73M2
GLUCOSE BLD-MCNC: 144 MG/DL (ref 70–108)
HBA1C MFR BLD: 7 % (ref 4.4–6.4)
HCT VFR BLD CALC: 44.9 % (ref 42–52)
HEMOGLOBIN: 15.2 GM/DL (ref 14–18)
MCH RBC QN AUTO: 32 PG (ref 26–33)
MCHC RBC AUTO-ENTMCNC: 33.9 GM/DL (ref 32.2–35.5)
MCV RBC AUTO: 94.5 FL (ref 80–94)
PLATELET # BLD: 255 THOU/MM3 (ref 130–400)
PMV BLD AUTO: 11.3 FL (ref 9.4–12.4)
POTASSIUM SERPL-SCNC: 3.5 MEQ/L (ref 3.5–5.2)
PROSTATE SPECIFIC ANTIGEN: 1.06 NG/ML (ref 0–1)
RBC # BLD: 4.75 MILL/MM3 (ref 4.7–6.1)
SODIUM BLD-SCNC: 142 MEQ/L (ref 135–145)
TOTAL PROTEIN: 7.5 G/DL (ref 6.1–8)
WBC # BLD: 6.6 THOU/MM3 (ref 4.8–10.8)

## 2020-11-16 PROCEDURE — 1036F TOBACCO NON-USER: CPT | Performed by: FAMILY MEDICINE

## 2020-11-16 PROCEDURE — 83036 HEMOGLOBIN GLYCOSYLATED A1C: CPT

## 2020-11-16 PROCEDURE — 3017F COLORECTAL CA SCREEN DOC REV: CPT | Performed by: FAMILY MEDICINE

## 2020-11-16 PROCEDURE — 85027 COMPLETE CBC AUTOMATED: CPT

## 2020-11-16 PROCEDURE — 80053 COMPREHEN METABOLIC PANEL: CPT

## 2020-11-16 PROCEDURE — 90694 VACC AIIV4 NO PRSRV 0.5ML IM: CPT | Performed by: FAMILY MEDICINE

## 2020-11-16 PROCEDURE — 4040F PNEUMOC VAC/ADMIN/RCVD: CPT | Performed by: FAMILY MEDICINE

## 2020-11-16 PROCEDURE — G8427 DOCREV CUR MEDS BY ELIG CLIN: HCPCS | Performed by: FAMILY MEDICINE

## 2020-11-16 PROCEDURE — G8417 CALC BMI ABV UP PARAM F/U: HCPCS | Performed by: FAMILY MEDICINE

## 2020-11-16 PROCEDURE — 3288F FALL RISK ASSESSMENT DOCD: CPT | Performed by: FAMILY MEDICINE

## 2020-11-16 PROCEDURE — 3051F HG A1C>EQUAL 7.0%<8.0%: CPT | Performed by: FAMILY MEDICINE

## 2020-11-16 PROCEDURE — G0103 PSA SCREENING: HCPCS

## 2020-11-16 PROCEDURE — 90732 PPSV23 VACC 2 YRS+ SUBQ/IM: CPT | Performed by: FAMILY MEDICINE

## 2020-11-16 PROCEDURE — 36415 COLL VENOUS BLD VENIPUNCTURE: CPT

## 2020-11-16 PROCEDURE — G0008 ADMIN INFLUENZA VIRUS VAC: HCPCS | Performed by: FAMILY MEDICINE

## 2020-11-16 PROCEDURE — G8484 FLU IMMUNIZE NO ADMIN: HCPCS | Performed by: FAMILY MEDICINE

## 2020-11-16 PROCEDURE — 99214 OFFICE O/P EST MOD 30 MIN: CPT | Performed by: FAMILY MEDICINE

## 2020-11-16 PROCEDURE — 2022F DILAT RTA XM EVC RTNOPTHY: CPT | Performed by: FAMILY MEDICINE

## 2020-11-16 PROCEDURE — 1123F ACP DISCUSS/DSCN MKR DOCD: CPT | Performed by: FAMILY MEDICINE

## 2020-11-16 PROCEDURE — G0009 ADMIN PNEUMOCOCCAL VACCINE: HCPCS | Performed by: FAMILY MEDICINE

## 2020-11-16 RX ORDER — AMLODIPINE BESYLATE 10 MG/1
TABLET ORAL
Qty: 90 TABLET | Refills: 1 | Status: SHIPPED | OUTPATIENT
Start: 2020-11-16 | End: 2021-05-17 | Stop reason: SDUPTHER

## 2020-11-16 RX ORDER — METOPROLOL SUCCINATE 100 MG/1
100 TABLET, EXTENDED RELEASE ORAL DAILY
Qty: 90 TABLET | Refills: 3 | Status: SHIPPED | OUTPATIENT
Start: 2020-11-16 | End: 2021-05-17 | Stop reason: SDUPTHER

## 2020-11-16 RX ORDER — VALSARTAN AND HYDROCHLOROTHIAZIDE 160; 25 MG/1; MG/1
1 TABLET ORAL DAILY
Qty: 90 TABLET | Refills: 3 | Status: SHIPPED | OUTPATIENT
Start: 2020-11-16 | End: 2021-05-17 | Stop reason: SDUPTHER

## 2020-11-16 RX ORDER — ATORVASTATIN CALCIUM 40 MG/1
40 TABLET, FILM COATED ORAL DAILY
Qty: 90 TABLET | Refills: 3 | Status: SHIPPED | OUTPATIENT
Start: 2020-11-16 | End: 2021-05-17 | Stop reason: SDUPTHER

## 2020-11-16 ASSESSMENT — ENCOUNTER SYMPTOMS
SHORTNESS OF BREATH: 0
COUGH: 1

## 2020-11-16 NOTE — PROGRESS NOTES
Packs/day: 2.00     Years: 20.00     Pack years: 40.00     Types: Cigarettes     Last attempt to quit: 1998     Years since quittin.3    Smokeless tobacco: Never Used   Substance Use Topics    Alcohol use: Yes     Comment: occassionally      Current Outpatient Medications   Medication Sig Dispense Refill    metoprolol succinate (TOPROL XL) 100 MG extended release tablet Take 1 tablet by mouth daily 90 tablet 3    valsartan-hydroCHLOROthiazide (DIOVAN-HCT) 160-25 MG per tablet Take 1 tablet by mouth daily 90 tablet 3    amLODIPine (NORVASC) 5 MG tablet TAKE 1 TABLET BY MOUTH ONE TIME A DAY 90 tablet 3    atorvastatin (LIPITOR) 40 MG tablet Take 1 tablet by mouth daily 90 tablet 3    metFORMIN (GLUCOPHAGE) 500 MG tablet Take 1 tablet by mouth 2 times daily (with meals) 180 tablet 3    aspirin 81 MG tablet Take 1 tablet by mouth daily. 1 tablet 0     No current facility-administered medications for this visit.       Allergies   Allergen Reactions    Crestor [Rosuvastatin] Other (See Comments)     Decreased HDL    Pcn [Penicillins] Rash     Health Maintenance   Topic Date Due    AAA screen  1955    Hepatitis C screen  1955    HIV screen  1970    Shingles Vaccine (1 of 2) 2005    Diabetic microalbuminuria test  2019    Diabetic retinal exam  2020    Flu vaccine (1) 2020    Diabetic foot exam  2020    Pneumococcal 65+ years Vaccine (1 of 1 - PPSV23) 2020    A1C test (Diabetic or Prediabetic)  2021    Lipid screen  2021    Potassium monitoring  2021    Creatinine monitoring  2021    DTaP/Tdap/Td vaccine (2 - Td) 2024    Colon cancer screen colonoscopy  2024    Hepatitis A vaccine  Aged Out    Hib vaccine  Aged Out    Meningococcal (ACWY) vaccine  Aged Out       Objective:  BP (!) 144/88 (Site: Right Upper Arm, Position: Sitting)   Pulse 72   Temp 97 °F (36.1 °C)   Ht 6' (1.829 m)   Wt 218 lb 6.4 oz (99.1 kg)   SpO2 97%   BMI 29.62 kg/m²   Physical Exam  Vitals signs reviewed. Constitutional:       General: He is not in acute distress. Appearance: He is well-developed. He is not ill-appearing. Cardiovascular:      Rate and Rhythm: Normal rate and regular rhythm. Heart sounds: No murmur. Pulmonary:      Effort: Pulmonary effort is normal. No respiratory distress. Breath sounds: Normal breath sounds. No wheezing or rhonchi. Musculoskeletal:      Right lower leg: No edema. Left lower leg: No edema. Neurological:      Mental Status: He is alert and oriented to person, place, and time. Mental status is at baseline. Psychiatric:         Mood and Affect: Mood normal.         Behavior: Behavior normal.       Visual inspection:  Deformity/amputation: absent  Skin lesions/pre-ulcerative calluses: absent  Edema: right- negative, left- negative    Sensory exam:  Monofilament sensation: normal  (minimum of 5 random plantar locations tested, avoiding callused areas - > 1 area with absence of sensation is + for neuropathy)    Plus at least one of the following:  Pulses: normal,       Impression/Plan:  1. Type 2 diabetes mellitus without complication, without long-term current use of insulin (HCC)  Chronic. Check status of control with A1c. Refill meds  -  DIABETES FOOT EXAM  - atorvastatin (LIPITOR) 40 MG tablet; Take 1 tablet by mouth daily  Dispense: 90 tablet; Refill: 3  - metFORMIN (GLUCOPHAGE) 500 MG tablet; Take 1 tablet by mouth 2 times daily (with meals)  Dispense: 180 tablet; Refill: 3  - Hemoglobin A1C; Future  - Comprehensive Metabolic Panel; Future  - CBC; Future    2. Essential hypertension  Chronic. Borderline controlled. Refill medications. Nurse visit in 1 week for blood pressure recheck. Check labs  - metoprolol succinate (TOPROL XL) 100 MG extended release tablet; Take 1 tablet by mouth daily  Dispense: 90 tablet;  Refill: 3  - valsartan-hydroCHLOROthiazide

## 2020-11-17 ENCOUNTER — TELEPHONE (OUTPATIENT)
Dept: FAMILY MEDICINE CLINIC | Age: 65
End: 2020-11-17

## 2020-11-17 NOTE — TELEPHONE ENCOUNTER
----- Message from Zoya Leahy MD sent at 11/17/2020  7:19 AM EST -----  CMP is good except mildly elevated glucose. PSA is good. A1c shows diabetes is borderline controlled. CBC is good. No medication changes for diabetes. Follow-up 6 months. Please advise patient.   Zoya Leahy MD

## 2021-05-17 ENCOUNTER — HOSPITAL ENCOUNTER (OUTPATIENT)
Age: 66
Discharge: HOME | End: 2021-05-17
Payer: MEDICARE

## 2021-05-17 ENCOUNTER — OFFICE VISIT (OUTPATIENT)
Dept: FAMILY MEDICINE CLINIC | Age: 66
End: 2021-05-17
Payer: MEDICARE

## 2021-05-17 VITALS
HEIGHT: 72 IN | RESPIRATION RATE: 15 BRPM | SYSTOLIC BLOOD PRESSURE: 138 MMHG | DIASTOLIC BLOOD PRESSURE: 80 MMHG | OXYGEN SATURATION: 98 % | HEART RATE: 58 BPM | TEMPERATURE: 97 F | WEIGHT: 215.8 LBS | BODY MASS INDEX: 29.23 KG/M2

## 2021-05-17 DIAGNOSIS — E78.49 OTHER HYPERLIPIDEMIA: Chronic | ICD-10-CM

## 2021-05-17 DIAGNOSIS — I10 ESSENTIAL HYPERTENSION: ICD-10-CM

## 2021-05-17 DIAGNOSIS — E11.9 TYPE 2 DIABETES MELLITUS WITHOUT COMPLICATION, WITHOUT LONG-TERM CURRENT USE OF INSULIN (HCC): ICD-10-CM

## 2021-05-17 DIAGNOSIS — Z00.00 ROUTINE GENERAL MEDICAL EXAMINATION AT A HEALTH CARE FACILITY: Primary | ICD-10-CM

## 2021-05-17 LAB
ALBUMIN SERPL-MCNC: 4.2 G/DL (ref 3.5–5.1)
ALP BLD-CCNC: 70 U/L (ref 38–126)
ALT SERPL-CCNC: 49 U/L (ref 11–66)
ANION GAP SERPL CALCULATED.3IONS-SCNC: 15 MEQ/L (ref 8–16)
AST SERPL-CCNC: 28 U/L (ref 5–40)
AVERAGE GLUCOSE: 144 MG/DL (ref 70–126)
BILIRUB SERPL-MCNC: 0.5 MG/DL (ref 0.3–1.2)
BUN BLDV-MCNC: 19 MG/DL (ref 7–22)
CALCIUM SERPL-MCNC: 9.5 MG/DL (ref 8.5–10.5)
CHLORIDE BLD-SCNC: 100 MEQ/L (ref 98–111)
CHOLESTEROL, TOTAL: 124 MG/DL (ref 100–199)
CO2: 25 MEQ/L (ref 23–33)
CREAT SERPL-MCNC: 1.2 MG/DL (ref 0.4–1.2)
GFR SERPL CREATININE-BSD FRML MDRD: 61 ML/MIN/1.73M2
GLUCOSE BLD-MCNC: 141 MG/DL (ref 70–108)
HBA1C MFR BLD: 6.8 % (ref 4.4–6.4)
HDLC SERPL-MCNC: 26 MG/DL
LDL CHOLESTEROL CALCULATED: 53 MG/DL
POTASSIUM SERPL-SCNC: 3.8 MEQ/L (ref 3.5–5.2)
SODIUM BLD-SCNC: 140 MEQ/L (ref 135–145)
TOTAL PROTEIN: 7.3 G/DL (ref 6.1–8)
TRIGL SERPL-MCNC: 227 MG/DL (ref 0–199)

## 2021-05-17 PROCEDURE — G0402 INITIAL PREVENTIVE EXAM: HCPCS | Performed by: FAMILY MEDICINE

## 2021-05-17 PROCEDURE — 1123F ACP DISCUSS/DSCN MKR DOCD: CPT | Performed by: FAMILY MEDICINE

## 2021-05-17 PROCEDURE — 83036 HEMOGLOBIN GLYCOSYLATED A1C: CPT

## 2021-05-17 PROCEDURE — 80053 COMPREHEN METABOLIC PANEL: CPT

## 2021-05-17 PROCEDURE — 36415 COLL VENOUS BLD VENIPUNCTURE: CPT

## 2021-05-17 PROCEDURE — 80061 LIPID PANEL: CPT

## 2021-05-17 PROCEDURE — 3017F COLORECTAL CA SCREEN DOC REV: CPT | Performed by: FAMILY MEDICINE

## 2021-05-17 PROCEDURE — 3046F HEMOGLOBIN A1C LEVEL >9.0%: CPT | Performed by: FAMILY MEDICINE

## 2021-05-17 PROCEDURE — 4040F PNEUMOC VAC/ADMIN/RCVD: CPT | Performed by: FAMILY MEDICINE

## 2021-05-17 RX ORDER — VALSARTAN AND HYDROCHLOROTHIAZIDE 160; 25 MG/1; MG/1
1 TABLET ORAL DAILY
Qty: 90 TABLET | Refills: 1 | Status: SHIPPED | OUTPATIENT
Start: 2021-05-17 | End: 2021-11-22 | Stop reason: SDUPTHER

## 2021-05-17 RX ORDER — AMLODIPINE BESYLATE 10 MG/1
TABLET ORAL
Qty: 90 TABLET | Refills: 1 | Status: SHIPPED | OUTPATIENT
Start: 2021-05-17 | End: 2021-11-22 | Stop reason: SDUPTHER

## 2021-05-17 RX ORDER — METOPROLOL SUCCINATE 100 MG/1
100 TABLET, EXTENDED RELEASE ORAL DAILY
Qty: 90 TABLET | Refills: 1 | Status: SHIPPED | OUTPATIENT
Start: 2021-05-17 | End: 2021-11-22 | Stop reason: SDUPTHER

## 2021-05-17 RX ORDER — ATORVASTATIN CALCIUM 40 MG/1
40 TABLET, FILM COATED ORAL DAILY
Qty: 90 TABLET | Refills: 1 | Status: SHIPPED | OUTPATIENT
Start: 2021-05-17 | End: 2021-11-22 | Stop reason: SDUPTHER

## 2021-05-17 ASSESSMENT — PATIENT HEALTH QUESTIONNAIRE - PHQ9
SUM OF ALL RESPONSES TO PHQ QUESTIONS 1-9: 0
SUM OF ALL RESPONSES TO PHQ QUESTIONS 1-9: 0
1. LITTLE INTEREST OR PLEASURE IN DOING THINGS: 0
2. FEELING DOWN, DEPRESSED OR HOPELESS: 0
SUM OF ALL RESPONSES TO PHQ9 QUESTIONS 1 & 2: 0

## 2021-05-17 ASSESSMENT — LIFESTYLE VARIABLES
HAVE YOU OR SOMEONE ELSE BEEN INJURED AS A RESULT OF YOUR DRINKING: 0
HOW OFTEN DO YOU HAVE A DRINK CONTAINING ALCOHOL: 1
HOW OFTEN DURING THE LAST YEAR HAVE YOU NEEDED AN ALCOHOLIC DRINK FIRST THING IN THE MORNING TO GET YOURSELF GOING AFTER A NIGHT OF HEAVY DRINKING: 0
HOW OFTEN DURING THE LAST YEAR HAVE YOU BEEN UNABLE TO REMEMBER WHAT HAPPENED THE NIGHT BEFORE BECAUSE YOU HAD BEEN DRINKING: 0
HAS A RELATIVE, FRIEND, DOCTOR, OR ANOTHER HEALTH PROFESSIONAL EXPRESSED CONCERN ABOUT YOUR DRINKING OR SUGGESTED YOU CUT DOWN: 0
HOW OFTEN DURING THE LAST YEAR HAVE YOU FAILED TO DO WHAT WAS NORMALLY EXPECTED FROM YOU BECAUSE OF DRINKING: 0

## 2021-05-17 NOTE — PROGRESS NOTES
Pressure Mother     Cancer Father        CareTeam (Including outside providers/suppliers regularly involved in providing care):   Patient Care Team:  Lebron Thakur MD as PCP - General (Family Medicine)  Lebron Thakur MD as PCP - [facility]    Wt Readings from Last 3 Encounters:   05/17/21 215 lb 12.8 oz (97.9 kg)   11/16/20 218 lb 6.4 oz (99.1 kg)   05/28/20 215 lb (97.5 kg)     Vitals:    05/17/21 0745   BP: 138/80   Site: Left Upper Arm   Position: Sitting   Pulse: 58   Resp: 15   Temp: 97 °F (36.1 °C)   SpO2: 98%   Weight: 215 lb 12.8 oz (97.9 kg)   Height: 6' (1.829 m)     Body mass index is 29.27 kg/m². Based upon direct observation of the patient, evaluation of cognition reveals recent and remote memory intact. Physical Exam  Vitals reviewed. Constitutional:       General: He is not in acute distress. Appearance: He is well-developed. He is not ill-appearing. Cardiovascular:      Rate and Rhythm: Normal rate and regular rhythm. Heart sounds: No murmur heard. Pulmonary:      Effort: Pulmonary effort is normal. No respiratory distress. Breath sounds: Normal breath sounds. No wheezing or rhonchi. Musculoskeletal:      Right lower leg: No edema. Left lower leg: No edema. Neurological:      Mental Status: He is alert. Mental status is at baseline. Psychiatric:         Mood and Affect: Mood normal.         Behavior: Behavior normal.           Patient's complete Health Risk Assessment and screening values have been reviewed and are found in Flowsheets. The following problems were reviewed today and where indicated follow up appointments were made and/or referrals ordered. Positive Risk Factor Screenings with Interventions:          General Health and ACP:  General  In general, how would you say your health is?: Excellent  In the past 7 days, have you experienced any of the following?  New or Increased Pain, New or Increased Fatigue, Loneliness, Social Isolation, Stress or Anger?: None of These  Do you get the social and emotional support that you need?: Yes  Do you have a Living Will?: Yes  Advance Directives     Power of  Living Will ACP-Advance Directive ACP-Power of     Not on File Not on File Not on File Not on File      General Health Risk Interventions:  · n/a        Personalized Preventive Plan   Current Health Maintenance Status  Immunization History   Administered Date(s) Administered    Hepatitis A 06/25/2014, 01/09/2015    Hepatitis A Adult (Havrix, Vaqta) 06/25/2014, 01/09/2015    Hepatitis B 03/01/2003, 04/01/2003, 09/01/2003    Influenza A (I5Q0-07) Vaccine IM 02/19/2010    Influenza A (H3K4-01) Vaccine PF IM 02/19/2010    Influenza Virus Vaccine 10/15/2016    Influenza, Edson Hum, IM, (6 mo and older Fluzone, Flulaval, Fluarix and 3 yrs and older Afluria) 10/15/2016    Influenza, Quadv, adjuvanted, 65 yrs +, IM, PF (Fluad) 11/16/2020    Japanese Encephalitis Vaccine Sq 12/05/2014, 01/09/2015    Japanese Encephalitis, IM (Ixiaro) 12/05/2014, 01/09/2015    Pneumococcal Polysaccharide ([id number]) 11/16/2020    Tdap (Boostrix, Adacel) 06/25/2014    Typhoid Vi capsular polysaccharide (Typhim VI) 06/25/2014    Yellow Fever (YF-Vax) 06/25/2014        Health Maintenance   Topic Date Due    AAA screen  Never done    Hepatitis C screen  Never done    HIV screen  Never done    Diabetic microalbuminuria test  03/16/2019    Diabetic retinal exam  06/03/2020    Annual Wellness Visit (AWV)  Never done    Lipid screen  05/30/2021    Diabetic foot exam  11/16/2021    A1C test (Diabetic or Prediabetic)  11/16/2021    Potassium monitoring  11/16/2021    Creatinine monitoring  11/16/2021    DTaP/Tdap/Td vaccine (2 - Td) 06/25/2024    Colon cancer screen colonoscopy  11/05/2024    Flu vaccine  Completed    Shingles Vaccine  Completed    Pneumococcal 65+ years Vaccine  Completed    COVID-19 Vaccine  Completed    Hepatitis A vaccine  Aged Out    Hib vaccine  Aged Out    Meningococcal (ACWY) vaccine  Aged Out     Recommendations for IMGuest Due: see orders and patient instructions/AVS.  . Recommended screening schedule for the next 5-10 years is provided to the patient in written form: see Patient Instructions/AVS.    Marilu Higgins was seen today for medicare awv and hypertension. Diagnoses and all orders for this visit:    Routine general medical examination at a health care facility    Type 2 diabetes mellitus without complication, without long-term current use of insulin (HCC)  -     atorvastatin (LIPITOR) 40 MG tablet; Take 1 tablet by mouth daily  -     metFORMIN (GLUCOPHAGE) 500 MG tablet; Take 1 tablet by mouth 2 times daily (with meals)  -     Hemoglobin A1C; Future  -     Comprehensive Metabolic Panel; Future    Essential hypertension  -     metoprolol succinate (TOPROL XL) 100 MG extended release tablet; Take 1 tablet by mouth daily  -     valsartan-hydroCHLOROthiazide (DIOVAN-HCT) 160-25 MG per tablet; Take 1 tablet by mouth daily  -     amLODIPine (NORVASC) 10 MG tablet; TAKE 1 TABLET BY MOUTH ONE TIME A DAY  -     Comprehensive Metabolic Panel; Future    Other hyperlipidemia  -     atorvastatin (LIPITOR) 40 MG tablet; Take 1 tablet by mouth daily  -     Lipid Panel; Future             Above problems are deemed controlled. meds refilled. Check labs. He declines AAA screening.     F/u 6 months DM/ HTN      Yetta Crigler, MD

## 2021-05-18 ENCOUNTER — TELEPHONE (OUTPATIENT)
Dept: FAMILY MEDICINE CLINIC | Age: 66
End: 2021-05-18

## 2021-05-18 NOTE — TELEPHONE ENCOUNTER
----- Message from Miguel Angel Abbasi MD sent at 5/17/2021  8:19 PM EDT -----  A1c shows diabetes is well controlled. Lipids show LDL is great and triglcyerides are higher than previous. CMP is good. Please advise patient.   Miguel Angel Abbasi MD

## 2021-11-22 ENCOUNTER — OFFICE VISIT (OUTPATIENT)
Dept: FAMILY MEDICINE CLINIC | Age: 66
End: 2021-11-22
Payer: MEDICARE

## 2021-11-22 ENCOUNTER — HOSPITAL ENCOUNTER (OUTPATIENT)
Age: 66
Discharge: HOME | End: 2021-11-22
Payer: MEDICARE

## 2021-11-22 VITALS
SYSTOLIC BLOOD PRESSURE: 138 MMHG | HEIGHT: 72 IN | WEIGHT: 219.2 LBS | OXYGEN SATURATION: 98 % | RESPIRATION RATE: 16 BRPM | TEMPERATURE: 97 F | BODY MASS INDEX: 29.69 KG/M2 | DIASTOLIC BLOOD PRESSURE: 78 MMHG | HEART RATE: 59 BPM

## 2021-11-22 DIAGNOSIS — E11.9 TYPE 2 DIABETES MELLITUS WITHOUT COMPLICATION, WITHOUT LONG-TERM CURRENT USE OF INSULIN (HCC): Primary | ICD-10-CM

## 2021-11-22 DIAGNOSIS — L57.0 AK (ACTINIC KERATOSIS): ICD-10-CM

## 2021-11-22 DIAGNOSIS — E78.49 OTHER HYPERLIPIDEMIA: Chronic | ICD-10-CM

## 2021-11-22 DIAGNOSIS — E11.9 TYPE 2 DIABETES MELLITUS WITHOUT COMPLICATION, WITHOUT LONG-TERM CURRENT USE OF INSULIN (HCC): ICD-10-CM

## 2021-11-22 DIAGNOSIS — I10 ESSENTIAL HYPERTENSION: ICD-10-CM

## 2021-11-22 DIAGNOSIS — Z13.6 SCREENING FOR AAA (ABDOMINAL AORTIC ANEURYSM): ICD-10-CM

## 2021-11-22 DIAGNOSIS — Z12.5 PROSTATE CANCER SCREENING: ICD-10-CM

## 2021-11-22 DIAGNOSIS — Z23 FLU VACCINE NEED: ICD-10-CM

## 2021-11-22 LAB
ALBUMIN SERPL-MCNC: 4.2 G/DL (ref 3.5–5.1)
ALP BLD-CCNC: 90 U/L (ref 38–126)
ALT SERPL-CCNC: 27 U/L (ref 11–66)
ANION GAP SERPL CALCULATED.3IONS-SCNC: 15 MEQ/L (ref 8–16)
AST SERPL-CCNC: 23 U/L (ref 5–40)
AVERAGE GLUCOSE: 147 MG/DL (ref 70–126)
BILIRUB SERPL-MCNC: 0.7 MG/DL (ref 0.3–1.2)
BUN BLDV-MCNC: 22 MG/DL (ref 7–22)
CALCIUM SERPL-MCNC: 9.6 MG/DL (ref 8.5–10.5)
CHLORIDE BLD-SCNC: 101 MEQ/L (ref 98–111)
CHOLESTEROL, TOTAL: 140 MG/DL (ref 100–199)
CO2: 26 MEQ/L (ref 23–33)
CREAT SERPL-MCNC: 1.2 MG/DL (ref 0.4–1.2)
ERYTHROCYTE [DISTWIDTH] IN BLOOD BY AUTOMATED COUNT: 13.1 % (ref 11.5–14.5)
ERYTHROCYTE [DISTWIDTH] IN BLOOD BY AUTOMATED COUNT: 44.8 FL (ref 35–45)
GFR SERPL CREATININE-BSD FRML MDRD: 61 ML/MIN/1.73M2
GLUCOSE BLD-MCNC: 157 MG/DL (ref 70–108)
HBA1C MFR BLD: 6.9 % (ref 4.4–6.4)
HCT VFR BLD CALC: 45.5 % (ref 42–52)
HDLC SERPL-MCNC: 26 MG/DL
HEMOGLOBIN: 15.1 GM/DL (ref 14–18)
LDL CHOLESTEROL CALCULATED: 60 MG/DL
MCH RBC QN AUTO: 31.4 PG (ref 26–33)
MCHC RBC AUTO-ENTMCNC: 33.2 GM/DL (ref 32.2–35.5)
MCV RBC AUTO: 94.6 FL (ref 80–94)
PLATELET # BLD: 236 THOU/MM3 (ref 130–400)
PMV BLD AUTO: 11.5 FL (ref 9.4–12.4)
POTASSIUM SERPL-SCNC: 4 MEQ/L (ref 3.5–5.2)
PROSTATE SPECIFIC ANTIGEN: 1.28 NG/ML (ref 0–1)
RBC # BLD: 4.81 MILL/MM3 (ref 4.7–6.1)
SODIUM BLD-SCNC: 142 MEQ/L (ref 135–145)
TOTAL PROTEIN: 6.8 G/DL (ref 6.1–8)
TRIGL SERPL-MCNC: 269 MG/DL (ref 0–199)
WBC # BLD: 7.1 THOU/MM3 (ref 4.8–10.8)

## 2021-11-22 PROCEDURE — 36415 COLL VENOUS BLD VENIPUNCTURE: CPT

## 2021-11-22 PROCEDURE — 80053 COMPREHEN METABOLIC PANEL: CPT

## 2021-11-22 PROCEDURE — 1036F TOBACCO NON-USER: CPT | Performed by: FAMILY MEDICINE

## 2021-11-22 PROCEDURE — 99214 OFFICE O/P EST MOD 30 MIN: CPT | Performed by: FAMILY MEDICINE

## 2021-11-22 PROCEDURE — G8427 DOCREV CUR MEDS BY ELIG CLIN: HCPCS | Performed by: FAMILY MEDICINE

## 2021-11-22 PROCEDURE — 90694 VACC AIIV4 NO PRSRV 0.5ML IM: CPT | Performed by: FAMILY MEDICINE

## 2021-11-22 PROCEDURE — G8417 CALC BMI ABV UP PARAM F/U: HCPCS | Performed by: FAMILY MEDICINE

## 2021-11-22 PROCEDURE — 3017F COLORECTAL CA SCREEN DOC REV: CPT | Performed by: FAMILY MEDICINE

## 2021-11-22 PROCEDURE — 1123F ACP DISCUSS/DSCN MKR DOCD: CPT | Performed by: FAMILY MEDICINE

## 2021-11-22 PROCEDURE — 80061 LIPID PANEL: CPT

## 2021-11-22 PROCEDURE — G0103 PSA SCREENING: HCPCS

## 2021-11-22 PROCEDURE — 4040F PNEUMOC VAC/ADMIN/RCVD: CPT | Performed by: FAMILY MEDICINE

## 2021-11-22 PROCEDURE — 85027 COMPLETE CBC AUTOMATED: CPT

## 2021-11-22 PROCEDURE — 83036 HEMOGLOBIN GLYCOSYLATED A1C: CPT

## 2021-11-22 PROCEDURE — 2022F DILAT RTA XM EVC RTNOPTHY: CPT | Performed by: FAMILY MEDICINE

## 2021-11-22 PROCEDURE — G8484 FLU IMMUNIZE NO ADMIN: HCPCS | Performed by: FAMILY MEDICINE

## 2021-11-22 PROCEDURE — 3044F HG A1C LEVEL LT 7.0%: CPT | Performed by: FAMILY MEDICINE

## 2021-11-22 PROCEDURE — G0008 ADMIN INFLUENZA VIRUS VAC: HCPCS | Performed by: FAMILY MEDICINE

## 2021-11-22 RX ORDER — VALSARTAN AND HYDROCHLOROTHIAZIDE 160; 25 MG/1; MG/1
1 TABLET ORAL DAILY
Qty: 90 TABLET | Refills: 1 | Status: SHIPPED | OUTPATIENT
Start: 2021-11-22 | End: 2022-05-19

## 2021-11-22 RX ORDER — AMLODIPINE BESYLATE 10 MG/1
TABLET ORAL
Qty: 90 TABLET | Refills: 1 | Status: SHIPPED | OUTPATIENT
Start: 2021-11-22 | End: 2022-05-19

## 2021-11-22 RX ORDER — ATORVASTATIN CALCIUM 40 MG/1
40 TABLET, FILM COATED ORAL DAILY
Qty: 90 TABLET | Refills: 1 | Status: SHIPPED | OUTPATIENT
Start: 2021-11-22 | End: 2022-05-19

## 2021-11-22 RX ORDER — FLUOROURACIL 50 MG/G
CREAM TOPICAL
Qty: 40 G | Refills: 0 | Status: SHIPPED | OUTPATIENT
Start: 2021-11-22

## 2021-11-22 RX ORDER — METOPROLOL SUCCINATE 100 MG/1
100 TABLET, EXTENDED RELEASE ORAL DAILY
Qty: 90 TABLET | Refills: 1 | Status: SHIPPED | OUTPATIENT
Start: 2021-11-22 | End: 2022-05-19

## 2021-11-22 SDOH — ECONOMIC STABILITY: FOOD INSECURITY: WITHIN THE PAST 12 MONTHS, YOU WORRIED THAT YOUR FOOD WOULD RUN OUT BEFORE YOU GOT MONEY TO BUY MORE.: NEVER TRUE

## 2021-11-22 SDOH — ECONOMIC STABILITY: FOOD INSECURITY: WITHIN THE PAST 12 MONTHS, THE FOOD YOU BOUGHT JUST DIDN'T LAST AND YOU DIDN'T HAVE MONEY TO GET MORE.: NEVER TRUE

## 2021-11-22 ASSESSMENT — SOCIAL DETERMINANTS OF HEALTH (SDOH): HOW HARD IS IT FOR YOU TO PAY FOR THE VERY BASICS LIKE FOOD, HOUSING, MEDICAL CARE, AND HEATING?: NOT HARD AT ALL

## 2021-11-22 ASSESSMENT — ENCOUNTER SYMPTOMS
SHORTNESS OF BREATH: 0
WHEEZING: 0

## 2021-11-22 NOTE — PROGRESS NOTES
[facility]  [address] [facility]  Dept: [phone]  Dept Fax: [phone]  Loc: [phone]  PROGRESS NOTE      Visit Date: 2021    Manuel Plata is a 77 y.o. male who presents today for:  Chief Complaint   Patient presents with    6 Month Follow-Up     HTN, DM       Subjective:  HPI    6 month Follow-up    Type 2 diabetes: On Metformin. On ARB and statin. No hx of CAD. Glucose <140 at home    Hypertension: On Diovan HCT, Norvasc, and metoprolol. Hyperlipidemia: On Lipitor. No myalgias. New problem:  Skin lesion on left hand. Not healing. For past 6 months     Review of Systems   Constitutional: Negative for chills and fever. Respiratory: Negative for shortness of breath and wheezing. Cardiovascular: Negative for chest pain and leg swelling.      Patient Active Problem List   Diagnosis    Type II or unspecified type diabetes mellitus without mention of complication, not stated as uncontrolled    Gout    Hypertension    Hyperlipidemia     Past Medical History:   Diagnosis Date    Diabetes mellitus ([address]     Gout     Hyperlipidemia     Hypertension     Type II or unspecified type diabetes mellitus without mention of complication, not stated as uncontrolled       Past Surgical History:   Procedure Laterality Date    COLONOSCOPY      COLONOSCOPY Left 2019    COLONOSCOPY POLYPECTOMY SNARE/COLD BIOPSY performed by Darylene Brazier, MD at [place] [address]      10 yrs ago    WISDOM TOOTH EXTRACTION       Family History   Problem Relation Age of Onset    Stroke Mother     High Blood Pressure Mother     Cancer Father      Social History     Tobacco Use    Smoking status: Former Smoker     Packs/day: 2.00     Years: 20.00     Pack years: 40.00     Types: Cigarettes     Quit date: 1998     Years since quittin.3    Smokeless tobacco: Never Used   Substance Use Topics  Alcohol use: Yes     Comment: occassionally      Current Outpatient Medications   Medication Sig Dispense Refill    metoprolol succinate (TOPROL XL) 100 MG extended release tablet Take 1 tablet by mouth daily 90 tablet 1    valsartan-hydroCHLOROthiazide (DIOVAN-HCT) 160-25 MG per tablet Take 1 tablet by mouth daily 90 tablet 1    amLODIPine (NORVASC) 10 MG tablet TAKE 1 TABLET BY MOUTH ONE TIME A DAY 90 tablet 1    atorvastatin (LIPITOR) 40 MG tablet Take 1 tablet by mouth daily 90 tablet 1    metFORMIN (GLUCOPHAGE) 500 MG tablet Take 1 tablet by mouth 2 times daily (with meals) 180 tablet 1    aspirin 81 MG tablet Take 1 tablet by mouth daily. 1 tablet 0     No current facility-administered medications for this visit. Allergies   Allergen Reactions    Crestor [Rosuvastatin] Other (See Comments)     Decreased HDL    Pcn [Penicillins] Rash     Health Maintenance   Topic Date Due    AAA screen  Never done    Hepatitis C screen  Never done    Diabetic microalbuminuria test  03/16/2019    Diabetic retinal exam  06/03/2020    Flu vaccine (1) 09/01/2021    COVID-19 Vaccine (3 - Booster for Moderna series) 11/06/2021    Diabetic foot exam  11/16/2021    A1C test (Diabetic or Prediabetic)  05/17/2022    Lipid screen  05/17/2022    Potassium monitoring  05/17/2022    Creatinine monitoring  05/17/2022    Annual Wellness Visit (AWV)  05/18/2022    DTaP/Tdap/Td vaccine (2 - Td or Tdap) 06/25/2024    Colon cancer screen colonoscopy  11/05/2024    Shingles Vaccine  Completed    Pneumococcal 65+ years Vaccine  Completed    Hepatitis A vaccine  Aged Out    Hib vaccine  Aged Out    Meningococcal (ACWY) vaccine  Aged Out       Objective:  /78 (Site: Left Upper Arm, Position: Sitting, Cuff Size: Medium Adult)   Pulse 59   Temp 97 °F (36.1 °C) (Temporal)   Resp 16   Ht 6' (1.829 m)   Wt 219 lb 3.2 oz (99.4 kg)   SpO2 98%   BMI 29.73 kg/m²   Physical Exam  Vitals reviewed. (NORVASC) 10 MG tablet; TAKE 1 TABLET BY MOUTH ONE TIME A DAY  Dispense: 90 tablet; Refill: 1  - CBC; Future  - Comprehensive Metabolic Panel; Future    3. Other hyperlipidemia  Chronic. Check status of control with lipid panel. Refill med  - atorvastatin (LIPITOR) 40 MG tablet; Take 1 tablet by mouth daily  Dispense: 90 tablet; Refill: 1  - Lipid Panel; Future    4. Prostate cancer screening  - PSA Screening; Future    5. Screening for AAA (abdominal aortic aneurysm)  - US SCREENING FOR AAA; Future    6. AK (actinic keratosis)  On left Dorsal hand. Treat with cream.  Return if not healing  - fluorouracil (EFUDEX) 5 % cream; Apply topically 2 times daily. Dispense: 40 g; Refill: 0    7. Flu vaccine need  - INFLUENZA, QUADV, ADJUVANTED, 65 YRS =, IM, PF, PREFILL SYR, 0.5ML (FLUAD)      They voiced understanding. All questions answered. They agreed with treatment plan. See patient instructions for any educational materials that may have been given. Discussed use, benefit, and side effects of prescribed medications. Reviewed health maintenance. (Please note that portions of this note may have been completed with a voice recognition program.  Efforts were made to edit the dictation but occasionally words are mis-transcribed.)    Return in about 6 months (around 5/22/2022) for medicare wellness.       Electronically signed by Indu Sam MD on 11/22/2021 at 8:08 AM 39+1 wk female born to 39 yo  mother via . Mat BT O+. Mat hx of GDM on insulin. PNL neg/NR/I, GBS neg on 3/15. AROM clear fluids at 19:36 on 3/29. Apgars 9/9. EOS 0.09. Breast feeding. Desires Hep B.     Since admission to the  nursery (NBN), baby has been feeding well, stooling and making wet diapers. Vitals have remained stable. Baby received routine NBN care. The baby lost an acceptable percentage of the birth weight. Stable for discharge to home after receiving routine  care education and instructions to follow up with pediatrician.    Bilirubin was xxxxx at xxxxx hours of life, which is ___ risk zone.  Please see below for CCHD, audiology and hepatitis vaccine status. 39+1 wk female born to 39 yo  mother via . Mat BT O+. Mat hx of GDM on insulin. PNL neg/NR/I, GBS neg on 3/15. AROM clear fluids at 19:36 on 3/29. Apgars 9/9. EOS 0.09. Breast feeding. Desires Hep B.     Since admission to the  nursery (NBN), baby has been feeding well, stooling and making wet diapers. Vitals have remained stable. Baby received routine NBN care. The baby lost an acceptable percentage of the birth weight. Stable for discharge to home after receiving routine  care education and instructions to follow up with pediatrician.    Bilirubin was 7.2 at 35 hours of life, which is low intermediate risk zone.  Please see below for CCHD, audiology and hepatitis vaccine status. 39+1 wk female born to 39 yo  mother via . Mat BT O+. Mat hx of GDM on insulin. PNL neg/NR/I, GBS neg on 3/15. AROM clear fluids at 19:36 on 3/29. Apgars 9/9. EOS 0.09. Breast feeding. Desires Hep B.     Since admission to the  nursery (NBN), baby has been feeding well, stooling and making wet diapers. Vitals have remained stable. Baby received routine NBN care. The baby lost an acceptable percentage of the birth weight. Stable for discharge to home after receiving routine  care education and instructions to follow up with pediatrician.    Bilirubin was 7.2 at 35 hours of life, which is low intermediate risk zone.  Please see below for CCHD, audiology and hepatitis vaccine status.     Attending Addendum    I have read and agree with above PGY1 Discharge Note. I have spent 25 minutes with the patient and the patient's family on direct patient care and discharge planning. More than >50% of the time was spent on counseling and/or discharge planning. Discharge note will be faxed to appropriate outpatient pediatrician.  Plan to follow-up per above.  Please see above weight and bilirubin. Discussed feeding, voiding and weight loss with mother.    Discharge Exam:  Gen: NAD, alert, active  HEENT: MMM, AFOF, + red reflex b/l  CVS: s1/s2, RRR, no murmur,  Lungs:LCTA b/l  Abd: S/NT/ND +BS, no HSM,  umb c/d/i  Neuro: +grasp/suck/gloria  Musc: lucas/ortolani WNL  Genitalia: normal for age and sex  Skin: no abnormal rash    Irais Hughes MD  Attending Pediatrics  [facility]

## 2021-11-22 NOTE — PROGRESS NOTES
Vaccine Information Sheet, \"Influenza - Inactivated\"  given to Baudilio Montoya., or parent/legal guardian of  Baudilio Montoya. and verbalized understanding. Patient responses:    Have you ever had a reaction to a flu vaccine? No  Do you have an allergy to eggs, neomycin or polymixin? No  Do you have an allergy to Thimerosal, contact lens solution, or Merthiolate? No  Have you ever had Guillian [place] Syndrome? No  Do you have any current illness? No  Do you have a temperature above 100 degrees? No  Are you pregnant? No  If pregnant, permission obtained from physician? N/A  Do you have an active neurological disorder? No      Flu vaccine given per order. Please see immunization tab.

## 2021-11-22 NOTE — PROGRESS NOTES
Immunization(s) given during visit:    Immunizations Administered     Name Date Dose Route    Influenza, Quadv, adjuvanted, 65 yrs +, IM, PF (Fluad) 11/22/2021 0.5 mL Intramuscular    Site: Deltoid- Right    Lot: [id number]    NDC: 55124-415-17          Most recent Vaccine Information Sheet dated 8/6/2021 given to pt

## 2021-11-23 ENCOUNTER — TELEPHONE (OUTPATIENT)
Dept: FAMILY MEDICINE CLINIC | Age: 66
End: 2021-11-23

## 2021-11-23 NOTE — TELEPHONE ENCOUNTER
----- Message from Izabel Proctor MD sent at 11/23/2021  7:37 AM EST -----  A1c shows diabetes is well controlled. PSA is stable. CBC and CMP are good. Lipids show LDL is good. Triglycerides are elevated. Please advise patient.   Izabel Proctor MD

## 2021-11-30 ENCOUNTER — HOSPITAL ENCOUNTER (OUTPATIENT)
Dept: ULTRASOUND IMAGING | Age: 66
Discharge: HOME | End: 2021-11-30
Payer: MEDICARE

## 2021-11-30 ENCOUNTER — TELEPHONE (OUTPATIENT)
Dept: FAMILY MEDICINE CLINIC | Age: 66
End: 2021-11-30

## 2021-11-30 DIAGNOSIS — Z13.6 SCREENING FOR AAA (ABDOMINAL AORTIC ANEURYSM): ICD-10-CM

## 2021-11-30 PROCEDURE — 76706 US ABDL AORTA SCREEN AAA: CPT

## 2021-11-30 NOTE — TELEPHONE ENCOUNTER
----- Message from Kang Caballero MD sent at 11/30/2021  1:20 PM EST -----  Normal US of aorta. Please advise patient.   Kang Caballero MD

## 2022-05-19 DIAGNOSIS — E11.9 TYPE 2 DIABETES MELLITUS WITHOUT COMPLICATION, WITHOUT LONG-TERM CURRENT USE OF INSULIN (HCC): ICD-10-CM

## 2022-05-19 DIAGNOSIS — E78.49 OTHER HYPERLIPIDEMIA: Chronic | ICD-10-CM

## 2022-05-19 DIAGNOSIS — I10 ESSENTIAL HYPERTENSION: ICD-10-CM

## 2022-05-19 RX ORDER — METOPROLOL SUCCINATE 100 MG/1
TABLET, EXTENDED RELEASE ORAL
Qty: 90 TABLET | Refills: 0 | Status: SHIPPED | OUTPATIENT
Start: 2022-05-19 | End: 2022-08-19

## 2022-05-19 RX ORDER — AMLODIPINE BESYLATE 10 MG/1
TABLET ORAL
Qty: 90 TABLET | Refills: 0 | Status: SHIPPED | OUTPATIENT
Start: 2022-05-19 | End: 2022-08-19

## 2022-05-19 RX ORDER — ATORVASTATIN CALCIUM 40 MG/1
TABLET, FILM COATED ORAL
Qty: 90 TABLET | Refills: 0 | Status: SHIPPED | OUTPATIENT
Start: 2022-05-19 | End: 2022-08-22

## 2022-05-19 RX ORDER — VALSARTAN AND HYDROCHLOROTHIAZIDE 160; 25 MG/1; MG/1
TABLET ORAL
Qty: 90 TABLET | Refills: 0 | Status: SHIPPED | OUTPATIENT
Start: 2022-05-19 | End: 2022-08-22

## 2022-05-19 NOTE — TELEPHONE ENCOUNTER
Phil Lozano. is requesting a refill on the following medications:  Requested Prescriptions     Pending Prescriptions Disp Refills    metFORMIN (GLUCOPHAGE) 500 MG tablet [Pharmacy Med Name: metFORMIN HCl Oral Tablet 500 MG] 180 tablet 0     Sig: TAKE 1 TABLET BY MOUTH TWO TIMES A DAY WITH MEALS    atorvastatin (LIPITOR) 40 MG tablet [Pharmacy Med Name: Atorvastatin Calcium Oral Tablet 40 MG] 90 tablet 0     Sig: TAKE 1 TABLET BY MOUTH EVERY DAY    amLODIPine (NORVASC) 10 MG tablet [Pharmacy Med Name: amLODIPine Besylate Oral Tablet 10 MG] 90 tablet 0     Sig: TAKE 1 TABLET BY MOUTH EVERY DAY    metoprolol succinate (TOPROL XL) 100 MG extended release tablet [Pharmacy Med Name: Metoprolol Succinate ER Oral Tablet Extended Release 24 Hour 100 MG] 90 tablet 0     Sig: TAKE 1 TABLET BY MOUTH EVERY DAY    valsartan-hydroCHLOROthiazide (DIOVAN-HCT) 160-25 MG per tablet [Pharmacy Med Name: Valsartan-hydroCHLOROthiazide Oral Tablet 160-25 MG] 90 tablet 0     Sig: TAKE 1 TABLET BY MOUTH EVERY DAY       Date of last visit: 11/22/2021  Date of next visit (if applicable):5/23/2022  Date of last refill: 11/22/2021  Pharmacy Name: [facility] LPN

## 2022-05-23 DIAGNOSIS — E11.9 TYPE 2 DIABETES MELLITUS WITHOUT COMPLICATION, WITHOUT LONG-TERM CURRENT USE OF INSULIN (HCC): ICD-10-CM

## 2022-05-23 DIAGNOSIS — E78.49 OTHER HYPERLIPIDEMIA: Chronic | ICD-10-CM

## 2022-05-23 DIAGNOSIS — I10 ESSENTIAL HYPERTENSION: ICD-10-CM

## 2022-05-23 RX ORDER — VALSARTAN AND HYDROCHLOROTHIAZIDE 160; 25 MG/1; MG/1
TABLET ORAL
Qty: 90 TABLET | Refills: 0 | OUTPATIENT
Start: 2022-05-23

## 2022-05-23 RX ORDER — ATORVASTATIN CALCIUM 40 MG/1
TABLET, FILM COATED ORAL
Qty: 90 TABLET | Refills: 0 | OUTPATIENT
Start: 2022-05-23

## 2022-05-27 ENCOUNTER — OFFICE VISIT (OUTPATIENT)
Dept: FAMILY MEDICINE CLINIC | Age: 67
End: 2022-05-27
Payer: MEDICARE

## 2022-05-27 VITALS
TEMPERATURE: 97.2 F | SYSTOLIC BLOOD PRESSURE: 132 MMHG | BODY MASS INDEX: 29.39 KG/M2 | WEIGHT: 217 LBS | HEART RATE: 60 BPM | OXYGEN SATURATION: 97 % | DIASTOLIC BLOOD PRESSURE: 72 MMHG | RESPIRATION RATE: 14 BRPM | HEIGHT: 72 IN

## 2022-05-27 DIAGNOSIS — Z00.00 MEDICARE ANNUAL WELLNESS VISIT, SUBSEQUENT: Primary | ICD-10-CM

## 2022-05-27 DIAGNOSIS — E78.49 OTHER HYPERLIPIDEMIA: ICD-10-CM

## 2022-05-27 DIAGNOSIS — I10 PRIMARY HYPERTENSION: ICD-10-CM

## 2022-05-27 DIAGNOSIS — E11.9 TYPE 2 DIABETES MELLITUS WITHOUT COMPLICATION, WITHOUT LONG-TERM CURRENT USE OF INSULIN (HCC): ICD-10-CM

## 2022-05-27 PROCEDURE — 3046F HEMOGLOBIN A1C LEVEL >9.0%: CPT | Performed by: FAMILY MEDICINE

## 2022-05-27 PROCEDURE — G0439 PPPS, SUBSEQ VISIT: HCPCS | Performed by: FAMILY MEDICINE

## 2022-05-27 PROCEDURE — 3017F COLORECTAL CA SCREEN DOC REV: CPT | Performed by: FAMILY MEDICINE

## 2022-05-27 PROCEDURE — 1123F ACP DISCUSS/DSCN MKR DOCD: CPT | Performed by: FAMILY MEDICINE

## 2022-05-27 ASSESSMENT — PATIENT HEALTH QUESTIONNAIRE - PHQ9
1. LITTLE INTEREST OR PLEASURE IN DOING THINGS: 0
SUM OF ALL RESPONSES TO PHQ QUESTIONS 1-9: 0
SUM OF ALL RESPONSES TO PHQ9 QUESTIONS 1 & 2: 0
2. FEELING DOWN, DEPRESSED OR HOPELESS: 0
SUM OF ALL RESPONSES TO PHQ QUESTIONS 1-9: 0

## 2022-05-27 ASSESSMENT — LIFESTYLE VARIABLES
HOW MANY STANDARD DRINKS CONTAINING ALCOHOL DO YOU HAVE ON A TYPICAL DAY: 1 OR 2
HOW OFTEN DO YOU HAVE A DRINK CONTAINING ALCOHOL: MONTHLY OR LESS

## 2022-05-27 NOTE — PATIENT INSTRUCTIONS
Personalized Preventive Plan for Teresa Resendez. - 5/27/2022  Medicare offers a range of preventive health benefits. Some of the tests and screenings are paid in full while other may be subject to a deductible, co-insurance, and/or copay. Some of these benefits include a comprehensive review of your medical history including lifestyle, illnesses that may run in your family, and various assessments and screenings as appropriate. After reviewing your medical record and screening and assessments performed today your provider may have ordered immunizations, labs, imaging, and/or referrals for you. A list of these orders (if applicable) as well as your Preventive Care list are included within your After Visit Summary for your review. Other Preventive Recommendations:    · A preventive eye exam performed by an eye specialist is recommended every 1-2 years to screen for glaucoma; cataracts, macular degeneration, and other eye disorders. · A preventive dental visit is recommended every 6 months. · Try to get at least 150 minutes of exercise per week or 10,000 steps per day on a pedometer . · Order or download the FREE \"Exercise & Physical Activity: Your Everyday Guide\" from [employer]. Call 9-340.270.6960 or search [employer] online. · You need 1887-2713 mg of calcium and 9694-9769 IU of vitamin D per day. It is possible to meet your calcium requirement with diet alone, but a vitamin D supplement is usually necessary to meet this goal.  · When exposed to the sun, use a sunscreen that protects against both UVA and UVB radiation with an SPF of 30 or greater. Reapply every 2 to 3 hours or after sweating, drying off with a towel, or swimming. · Always wear a seat belt when traveling in a car. Always wear a helmet when riding a bicycle or motorcycle.

## 2022-05-27 NOTE — PROGRESS NOTES
Medicare Annual Wellness Visit    Jong Abarca is here for Medicare AWV    Assessment & Plan   Medicare annual wellness visit, subsequent  Type 2 diabetes mellitus without complication, without long-term current use of insulin ([facility] [address]  -     Hemoglobin A1C; Future  -     Comprehensive Metabolic Panel; Future  Primary hypertension  -     Comprehensive Metabolic Panel; Future  Other hyperlipidemia  -     Lipid Panel; Future         Chronic problems as above. Check labs. No medication changes. Defers pneumonia vaccine  Recommend covid booster    Recommendations for Preventive Services Due: see orders and patient instructions/AVS.  Recommended screening schedule for the next 5-10 years is provided to the patient in written form: see Patient Instructions/AVS.     .f/u 6 months DM, HTN     Subjective       DM:  Glucose   On metformin    No concerns    Patient's complete Health Risk Assessment and screening values have been reviewed and are found in Flowsheets. The following problems were reviewed today and where indicated follow up appointments were made and/or referrals ordered.     Positive Risk Factor Screenings with Interventions:             General Health and ACP:  General  In general, how would you say your health is?: Very Good  In the past 7 days, have you experienced any of the following: New or Increased Pain, New or Increased Fatigue, Loneliness, Social Isolation, Stress or Anger?: No  Do you get the social and emotional support that you need?: Yes  Do you have a Living Will?: Yes    Advance Directives     Power of  Living Will ACP-Advance Directive ACP-Power of     Not on File Not on File Not on File Not on File      General Health Risk Interventions:  · n/a     Hearing/Vision:  Do you or your family notice any trouble with your hearing that hasn't been managed with hearing aids?: No  Do you have difficulty driving, watching TV, or doing any of your daily activities because of your eyesight?: No  Have you had an eye exam within the past year?: (!) No  No exam data present    Hearing/Vision Interventions:  · Vision concerns:  patient encouraged to make appointment with his/her eye specialist            Objective   Vitals:    05/27/22 1043   BP: 132/72   Site: Right Upper Arm   Position: Sitting   Pulse: 60   Resp: 14   Temp: 97.2 °F (36.2 °C)   SpO2: 97%   Weight: 217 lb (98.4 kg)   Height: 6' (1.829 m)      Body mass index is 29.43 kg/m². Physical Exam  Vitals reviewed. Constitutional:       General: He is not in acute distress. Appearance: He is well-developed. He is not ill-appearing. Cardiovascular:      Rate and Rhythm: Normal rate and regular rhythm. Heart sounds: No murmur heard. Pulmonary:      Effort: Pulmonary effort is normal. No respiratory distress. Breath sounds: Normal breath sounds. No wheezing or rhonchi. Musculoskeletal:      Right lower leg: No edema. Left lower leg: No edema. Neurological:      Mental Status: He is alert. Mental status is at baseline. Psychiatric:         Mood and Affect: Mood normal.         Behavior: Behavior normal.             Allergies   Allergen Reactions    Crestor [Rosuvastatin] Other (See Comments)     Decreased HDL    Pcn [Penicillins] Rash     Prior to Visit Medications    Medication Sig Taking?  Authorizing Provider   metFORMIN (GLUCOPHAGE) 500 MG tablet TAKE 1 TABLET BY MOUTH TWO TIMES A DAY WITH MEALS Yes Christina Blanca MD   atorvastatin (LIPITOR) 40 MG tablet TAKE 1 TABLET BY MOUTH EVERY DAY Yes Christina Blanca MD   amLODIPine (NORVASC) 10 MG tablet TAKE 1 TABLET BY MOUTH EVERY DAY Yes Christina Blanca MD   metoprolol succinate (TOPROL XL) 100 MG extended release tablet TAKE 1 TABLET BY MOUTH EVERY DAY Yes Christina Blanca MD   valsartan-hydroCHLOROthiazide (DIOVAN-HCT) 160-25 MG per tablet TAKE 1 TABLET BY MOUTH EVERY DAY Yes Christina Blanca MD   fluorouracil (EFUDEX) 5 % cream Apply topically 2 times daily. Yes Marissa Dorsey MD   aspirin 81 MG tablet Take 1 tablet by mouth daily.  Yes Kaveh Sheikh MD       [facility] (Including outside providers/suppliers regularly involved in providing care):   Patient Care Team:  Marissa Dorsey MD as PCP - General (Family Medicine)  Marissa Dorsey MD as PCP [facility]     Reviewed and updated this visit:  Tobacco  Allergies  Meds  Problems  Med Hx  Surg Hx  Soc Hx  Fam Hx

## 2022-06-01 ENCOUNTER — HOSPITAL ENCOUNTER (OUTPATIENT)
Age: 67
Discharge: HOME | End: 2022-06-01
Payer: MEDICARE

## 2022-06-01 DIAGNOSIS — E78.49 OTHER HYPERLIPIDEMIA: ICD-10-CM

## 2022-06-01 DIAGNOSIS — E11.9 TYPE 2 DIABETES MELLITUS WITHOUT COMPLICATION, WITHOUT LONG-TERM CURRENT USE OF INSULIN (HCC): ICD-10-CM

## 2022-06-01 DIAGNOSIS — I10 PRIMARY HYPERTENSION: ICD-10-CM

## 2022-06-01 LAB
ALBUMIN SERPL-MCNC: 4.6 G/DL (ref 3.5–5.1)
ALP BLD-CCNC: 91 U/L (ref 38–126)
ALT SERPL-CCNC: 58 U/L (ref 11–66)
ANION GAP SERPL CALCULATED.3IONS-SCNC: 16 MEQ/L (ref 8–16)
AST SERPL-CCNC: 48 U/L (ref 5–40)
AVERAGE GLUCOSE: 177 MG/DL (ref 70–126)
BILIRUB SERPL-MCNC: 0.7 MG/DL (ref 0.3–1.2)
BUN BLDV-MCNC: 23 MG/DL (ref 7–22)
CALCIUM SERPL-MCNC: 9.4 MG/DL (ref 8.5–10.5)
CHLORIDE BLD-SCNC: 98 MEQ/L (ref 98–111)
CHOLESTEROL, TOTAL: 129 MG/DL (ref 100–199)
CO2: 24 MEQ/L (ref 23–33)
CREAT SERPL-MCNC: 1.1 MG/DL (ref 0.4–1.2)
GFR SERPL CREATININE-BSD FRML MDRD: 67 ML/MIN/1.73M2
GLUCOSE BLD-MCNC: 140 MG/DL (ref 70–108)
HBA1C MFR BLD: 7.9 % (ref 4.4–6.4)
HDLC SERPL-MCNC: 24 MG/DL
LDL CHOLESTEROL CALCULATED: 45 MG/DL
POTASSIUM SERPL-SCNC: 3.5 MEQ/L (ref 3.5–5.2)
SODIUM BLD-SCNC: 138 MEQ/L (ref 135–145)
TOTAL PROTEIN: 6.9 G/DL (ref 6.1–8)
TRIGL SERPL-MCNC: 301 MG/DL (ref 0–199)

## 2022-06-01 PROCEDURE — 80053 COMPREHEN METABOLIC PANEL: CPT

## 2022-06-01 PROCEDURE — 83036 HEMOGLOBIN GLYCOSYLATED A1C: CPT

## 2022-06-01 PROCEDURE — 80061 LIPID PANEL: CPT

## 2022-06-01 PROCEDURE — 36415 COLL VENOUS BLD VENIPUNCTURE: CPT

## 2022-06-03 ENCOUNTER — TELEPHONE (OUTPATIENT)
Dept: FAMILY MEDICINE CLINIC | Age: 67
End: 2022-06-03

## 2022-06-03 NOTE — TELEPHONE ENCOUNTER
Called and notified patient of results. He has verbalized an understanding and will work on diet and exercise.

## 2022-06-03 NOTE — TELEPHONE ENCOUNTER
----- Message from Juan Miguel Landa MD sent at 6/3/2022 12:24 PM EDT -----  A1c is higher at 7.9%. Recommend working on diet and physical activity. Recommend follow-up appointment in 3 months with point-of-care A1c.  CMP is good. Cholesterol shows LDL is good and triglycerides are elevated.

## 2022-08-18 DIAGNOSIS — I10 ESSENTIAL HYPERTENSION: ICD-10-CM

## 2022-08-19 RX ORDER — METOPROLOL SUCCINATE 100 MG/1
TABLET, EXTENDED RELEASE ORAL
Qty: 90 TABLET | Refills: 0 | Status: SHIPPED | OUTPATIENT
Start: 2022-08-19

## 2022-08-19 RX ORDER — AMLODIPINE BESYLATE 10 MG/1
TABLET ORAL
Qty: 90 TABLET | Refills: 0 | Status: SHIPPED | OUTPATIENT
Start: 2022-08-19

## 2022-08-19 NOTE — TELEPHONE ENCOUNTER
Manuel Maravilla. is requesting a refill on the following medications:  Requested Prescriptions     Pending Prescriptions Disp Refills    metoprolol succinate (TOPROL XL) 100 MG extended release tablet [Pharmacy Med Name: Metoprolol Succinate ER Oral Tablet Extended Release 24 Hour 100 MG] 90 tablet 0     Sig: TAKE 1 TABLET BY MOUTH EVERY DAY    amLODIPine (NORVASC) 10 MG tablet [Pharmacy Med Name: amLODIPine Besylate Oral Tablet 10 MG] 90 tablet 0     Sig: TAKE 1 TABLET BY MOUTH EVERY DAY       Date of last visit: 5/27/2022  Date of next visit (if applicable):12/2/2022  Date of last refill: 5/19/2022  Pharmacy Name: [facility], [address], [place]

## 2022-08-20 DIAGNOSIS — E11.9 TYPE 2 DIABETES MELLITUS WITHOUT COMPLICATION, WITHOUT LONG-TERM CURRENT USE OF INSULIN (HCC): ICD-10-CM

## 2022-08-20 DIAGNOSIS — I10 ESSENTIAL HYPERTENSION: ICD-10-CM

## 2022-08-20 DIAGNOSIS — E78.49 OTHER HYPERLIPIDEMIA: Chronic | ICD-10-CM

## 2022-08-21 DIAGNOSIS — I10 ESSENTIAL HYPERTENSION: ICD-10-CM

## 2022-08-22 RX ORDER — AMLODIPINE BESYLATE 10 MG/1
TABLET ORAL
Qty: 90 TABLET | Refills: 0 | OUTPATIENT
Start: 2022-08-22

## 2022-08-22 RX ORDER — VALSARTAN AND HYDROCHLOROTHIAZIDE 160; 25 MG/1; MG/1
TABLET ORAL
Qty: 90 TABLET | Refills: 3 | Status: SHIPPED | OUTPATIENT
Start: 2022-08-22

## 2022-08-22 RX ORDER — ATORVASTATIN CALCIUM 40 MG/1
TABLET, FILM COATED ORAL
Qty: 90 TABLET | Refills: 3 | Status: SHIPPED | OUTPATIENT
Start: 2022-08-22

## 2022-08-22 RX ORDER — METOPROLOL SUCCINATE 100 MG/1
TABLET, EXTENDED RELEASE ORAL
Qty: 90 TABLET | Refills: 0 | OUTPATIENT
Start: 2022-08-22

## 2022-08-22 NOTE — TELEPHONE ENCOUNTER
[place]. is requesting a refill on the following medications:  Requested Prescriptions     Pending Prescriptions Disp Refills    valsartan-hydroCHLOROthiazide (DIOVAN-HCT) 160-25 MG per tablet [Pharmacy Med Name: Valsartan-hydroCHLOROthiazide Oral Tablet 160-25 MG] 90 tablet 0     Sig: TAKE 1 TABLET BY MOUTH EVERY DAY    metFORMIN (GLUCOPHAGE) 500 MG tablet [Pharmacy Med Name: metFORMIN HCl Oral Tablet 500 MG] 180 tablet 0     Sig: TAKE 1 TABLET BY MOUTH TWO TIMES A DAY WITH MEALS    atorvastatin (LIPITOR) 40 MG tablet [Pharmacy Med Name: Atorvastatin Calcium Oral Tablet 40 MG] 90 tablet 0     Sig: TAKE 1 TABLET BY MOUTH EVERY DAY       Date of last visit: 5/27/2022  Date of next visit (if applicable):8/21/2022  Date of last refill: 5/19/2022  [facility] [address]      Thanks,  Wesley Hernandez, [address] [place] [address] [place]

## 2022-11-19 DIAGNOSIS — I10 ESSENTIAL HYPERTENSION: ICD-10-CM

## 2022-11-21 RX ORDER — METOPROLOL SUCCINATE 100 MG/1
TABLET, EXTENDED RELEASE ORAL
Qty: 90 TABLET | Refills: 0 | Status: SHIPPED | OUTPATIENT
Start: 2022-11-21

## 2022-11-21 RX ORDER — AMLODIPINE BESYLATE 10 MG/1
TABLET ORAL
Qty: 90 TABLET | Refills: 0 | Status: SHIPPED | OUTPATIENT
Start: 2022-11-21

## 2022-11-21 NOTE — TELEPHONE ENCOUNTER
Brittney Weiss is requesting a refill on the following medications:  Requested Prescriptions     Pending Prescriptions Disp Refills    metoprolol succinate (TOPROL XL) 100 MG extended release tablet [Pharmacy Med Name: Metoprolol Succinate ER Oral Tablet Extended Release 24 Hour 100 MG] 90 tablet 0     Sig: TAKE 1 TABLET BY MOUTH EVERY DAY    amLODIPine (NORVASC) 10 MG tablet [Pharmacy Med Name: amLODIPine Besylate Oral Tablet 10 MG] 90 tablet 0     Sig: TAKE 1 TABLET BY MOUTH EVERY DAY       Date of last visit: 5/27/2022  Date of next visit (if applicable):12/2/2022  Date of last refill: 8/19/2022  [facility] [address],  [place], [place]

## 2022-12-02 ENCOUNTER — OFFICE VISIT (OUTPATIENT)
Dept: FAMILY MEDICINE CLINIC | Age: 67
End: 2022-12-02

## 2022-12-02 ENCOUNTER — HOSPITAL ENCOUNTER (OUTPATIENT)
Age: 67
Discharge: HOME | End: 2022-12-02
Payer: MEDICARE

## 2022-12-02 VITALS
DIASTOLIC BLOOD PRESSURE: 78 MMHG | HEART RATE: 77 BPM | BODY MASS INDEX: 29.01 KG/M2 | OXYGEN SATURATION: 96 % | WEIGHT: 214.2 LBS | SYSTOLIC BLOOD PRESSURE: 132 MMHG | HEIGHT: 72 IN

## 2022-12-02 DIAGNOSIS — E11.9 TYPE 2 DIABETES MELLITUS WITHOUT COMPLICATION, WITHOUT LONG-TERM CURRENT USE OF INSULIN (HCC): Primary | ICD-10-CM

## 2022-12-02 DIAGNOSIS — E11.9 TYPE 2 DIABETES MELLITUS WITHOUT COMPLICATION, WITHOUT LONG-TERM CURRENT USE OF INSULIN (HCC): ICD-10-CM

## 2022-12-02 DIAGNOSIS — Z23 INFLUENZA VACCINE NEEDED: ICD-10-CM

## 2022-12-02 DIAGNOSIS — I10 ESSENTIAL HYPERTENSION: ICD-10-CM

## 2022-12-02 DIAGNOSIS — Z12.5 PROSTATE CANCER SCREENING: ICD-10-CM

## 2022-12-02 DIAGNOSIS — E78.49 OTHER HYPERLIPIDEMIA: ICD-10-CM

## 2022-12-02 LAB
ALBUMIN SERPL-MCNC: 4 G/DL (ref 3.5–5.1)
ALP BLD-CCNC: 88 U/L (ref 38–126)
ALT SERPL-CCNC: 33 U/L (ref 11–66)
ANION GAP SERPL CALCULATED.3IONS-SCNC: 15 MEQ/L (ref 8–16)
AST SERPL-CCNC: 19 U/L (ref 5–40)
AVERAGE GLUCOSE: 174 MG/DL (ref 70–126)
BILIRUB SERPL-MCNC: 0.6 MG/DL (ref 0.3–1.2)
BUN BLDV-MCNC: 28 MG/DL (ref 7–22)
CALCIUM SERPL-MCNC: 9.5 MG/DL (ref 8.5–10.5)
CHLORIDE BLD-SCNC: 99 MEQ/L (ref 98–111)
CHOLESTEROL, TOTAL: 121 MG/DL (ref 100–199)
CO2: 25 MEQ/L (ref 23–33)
CREAT SERPL-MCNC: 1.2 MG/DL (ref 0.4–1.2)
ERYTHROCYTE [DISTWIDTH] IN BLOOD BY AUTOMATED COUNT: 13 % (ref 11.5–14.5)
ERYTHROCYTE [DISTWIDTH] IN BLOOD BY AUTOMATED COUNT: 45 FL (ref 35–45)
GFR SERPL CREATININE-BSD FRML MDRD: > 60 ML/MIN/1.73M2
GLUCOSE BLD-MCNC: 189 MG/DL (ref 70–108)
HBA1C MFR BLD: 7.8 % (ref 4.4–6.4)
HCT VFR BLD CALC: 44.5 % (ref 42–52)
HDLC SERPL-MCNC: 23 MG/DL
HEMOGLOBIN: 14.8 GM/DL (ref 14–18)
LDL CHOLESTEROL CALCULATED: 47 MG/DL
MCH RBC QN AUTO: 31.4 PG (ref 26–33)
MCHC RBC AUTO-ENTMCNC: 33.3 GM/DL (ref 32.2–35.5)
MCV RBC AUTO: 94.5 FL (ref 80–94)
PLATELET # BLD: 229 THOU/MM3 (ref 130–400)
PMV BLD AUTO: 11.2 FL (ref 9.4–12.4)
POTASSIUM SERPL-SCNC: 3.5 MEQ/L (ref 3.5–5.2)
PROSTATE SPECIFIC ANTIGEN: 0.93 NG/ML (ref 0–1)
RBC # BLD: 4.71 MILL/MM3 (ref 4.7–6.1)
SODIUM BLD-SCNC: 139 MEQ/L (ref 135–145)
TOTAL PROTEIN: 6.9 G/DL (ref 6.1–8)
TRIGL SERPL-MCNC: 257 MG/DL (ref 0–199)
WBC # BLD: 8.2 THOU/MM3 (ref 4.8–10.8)

## 2022-12-02 PROCEDURE — 80053 COMPREHEN METABOLIC PANEL: CPT

## 2022-12-02 PROCEDURE — 83036 HEMOGLOBIN GLYCOSYLATED A1C: CPT

## 2022-12-02 PROCEDURE — 80061 LIPID PANEL: CPT

## 2022-12-02 PROCEDURE — G0103 PSA SCREENING: HCPCS

## 2022-12-02 PROCEDURE — 85027 COMPLETE CBC AUTOMATED: CPT

## 2022-12-02 PROCEDURE — 36415 COLL VENOUS BLD VENIPUNCTURE: CPT

## 2022-12-02 RX ORDER — METOPROLOL SUCCINATE 100 MG/1
100 TABLET, EXTENDED RELEASE ORAL DAILY
Qty: 90 TABLET | Refills: 3 | Status: SHIPPED | OUTPATIENT
Start: 2022-12-02

## 2022-12-02 RX ORDER — AMLODIPINE BESYLATE 10 MG/1
10 TABLET ORAL DAILY
Qty: 90 TABLET | Refills: 3 | Status: SHIPPED | OUTPATIENT
Start: 2022-12-02

## 2022-12-02 ASSESSMENT — ENCOUNTER SYMPTOMS
SHORTNESS OF BREATH: 0
WHEEZING: 0

## 2022-12-02 NOTE — PROGRESS NOTES
Immunization(s) given during visit:    Immunizations Administered       Name Date Dose Route    Influenza, FLUAD, (age 72 y+), Adjuvanted, 0.5mL 12/2/2022 0.5 mL Intramuscular    Site: Deltoid- Left    Lot: [id number]    NDC: 12657-777-77          Vaccine Information Sheet, \"Influenza - Inactivated\"  given to Nikia Common., or parent/legal guardian of  Nikia Blandon. and verbalized understanding. Patient responses:    Have you ever had a reaction to a flu vaccine? No  Do you have an allergy to eggs, neomycin or polymixin? No  Do you have an allergy to Thimerosal, contact lens solution, or Merthiolate? No  Have you ever had Guillian [place] Syndrome? No  Do you have any current illness? No  Do you have a temperature above 100 degrees? No  Are you pregnant? No  If pregnant, permission obtained from physician? No  Do you have an active neurological disorder? No      Flu vaccine given per order. Please see immunization tab.

## 2022-12-02 NOTE — PROGRESS NOTES
[facility]  [address] [id number]  Dept: [phone]  Dept Fax: [phone]  Loc: [phone]  PROGRESS NOTE      Visit Date: 2022    Denise Guerrero is a 79 y.o. male who presents today for:  Chief Complaint   Patient presents with    Hypertension       Subjective:  Hypertension  Pertinent negatives include no chest pain or shortness of breath. 6 month Follow-up    Type 2 diabetes: a1c 7.9% in . On Metformin. On ARB and statin. No hx of CAD. Glucose 130-140 at home    Hypertension: On Diovan HCT, Norvasc, and metoprolol. Hyperlipidemia: On Lipitor. No myalgias. Exercise is steps and walking  Working part time in [employer]. Review of Systems   Constitutional:  Negative for chills and fever. Respiratory:  Negative for shortness of breath and wheezing. Cardiovascular:  Negative for chest pain and leg swelling.    Patient Active Problem List   Diagnosis    Type II or unspecified type diabetes mellitus without mention of complication, not stated as uncontrolled    Gout    Hypertension    Hyperlipidemia     Past Medical History:   Diagnosis Date    Diabetes mellitus ([facility] [address]     Gout     Hyperlipidemia     Hypertension     Type II or unspecified type diabetes mellitus without mention of complication, not stated as uncontrolled       Past Surgical History:   Procedure Laterality Date    COLONOSCOPY      COLONOSCOPY Left 2019    COLONOSCOPY POLYPECTOMY SNARE/COLD BIOPSY performed by Amaya Monge MD at [address]      10 yrs ago    WISDOM TOOTH EXTRACTION       Family History   Problem Relation Age of Onset    Stroke Mother     High Blood Pressure Mother     Cancer Father      Social History     Tobacco Use    Smoking status: Former     Packs/day: 2.00     Years: 20.00     Pack years: 40.00     Types: Cigarettes     Quit date: 1998     Years since quittin.4    Smokeless tobacco: Never   Substance Use Topics    Alcohol use: Yes     Comment: occassionally      Current Outpatient Medications   Medication Sig Dispense Refill    metoprolol succinate (TOPROL XL) 100 MG extended release tablet TAKE 1 TABLET BY MOUTH EVERY DAY 90 tablet 0    amLODIPine (NORVASC) 10 MG tablet TAKE 1 TABLET BY MOUTH EVERY DAY 90 tablet 0    valsartan-hydroCHLOROthiazide (DIOVAN-HCT) 160-25 MG per tablet TAKE 1 TABLET BY MOUTH EVERY DAY 90 tablet 3    metFORMIN (GLUCOPHAGE) 500 MG tablet TAKE 1 TABLET BY MOUTH TWO TIMES A DAY WITH MEALS 180 tablet 3    atorvastatin (LIPITOR) 40 MG tablet TAKE 1 TABLET BY MOUTH EVERY DAY 90 tablet 3    fluorouracil (EFUDEX) 5 % cream Apply topically 2 times daily. 40 g 0    aspirin 81 MG tablet Take 1 tablet by mouth daily. 1 tablet 0     No current facility-administered medications for this visit. Allergies   Allergen Reactions    Crestor [Rosuvastatin] Other (See Comments)     Decreased HDL    Pcn [Penicillins] Rash     Health Maintenance   Topic Date Due    Hepatitis C screen  Never done    Diabetic microalbuminuria test  03/16/2019    Diabetic retinal exam  06/03/2020    COVID-19 Vaccine (3 - Booster for Moderna series) 07/01/2021    Flu vaccine (1) 08/01/2022    Diabetic foot exam  11/22/2022    Depression Screen  05/27/2023    Annual Wellness Visit (AWV)  05/28/2023    A1C test (Diabetic or Prediabetic)  06/01/2023    Lipids  06/01/2023    DTaP/Tdap/Td vaccine (2 - Td or Tdap) 06/25/2024    Colorectal Cancer Screen  11/05/2024    Shingles vaccine  Completed    Pneumococcal 65+ years Vaccine  Completed    AAA screen  Completed    Hepatitis A vaccine  Aged Out    Hib vaccine  Aged Out    Meningococcal (ACWY) vaccine  Aged Out       Objective:  /78 (Site: Left Upper Arm, Position: Sitting)   Pulse 77   Ht 5' 11.5\" (1.816 m)   Wt 214 lb 3.2 oz (97.2 kg)   SpO2 96%   BMI 29.46 kg/m²   Physical Exam  Vitals reviewed.    Constitutional:       General: He is not in acute distress. Appearance: He is well-developed. He is not ill-appearing. Cardiovascular:      Rate and Rhythm: Normal rate and regular rhythm. Heart sounds: No murmur heard. Pulmonary:      Effort: Pulmonary effort is normal. No respiratory distress. Breath sounds: Normal breath sounds. No wheezing or rhonchi. Musculoskeletal:      Right lower leg: No edema. Left lower leg: No edema. Neurological:      Mental Status: He is alert. Mental status is at baseline. Psychiatric:         Mood and Affect: Mood normal.         Behavior: Behavior normal.     Visual inspection:  Deformity/amputation: 5th toe over 4th toe bilaterally  Skin lesions/pre-ulcerative calluses: absent  Edema: right- negative, left- negative    Sensory exam:  Monofilament sensation: normal  (minimum of 5 random plantar locations tested, avoiding callused areas - > 1 area with absence of sensation is + for neuropathy)    Plus at least one of the following:  Pulses: normal,     Impression/Plan:  1. Type 2 diabetes mellitus without complication, without long-term current use of insulin (HCC)  Chronic. Check status of control with A1c. Continue metformin  - HM DIABETES FOOT EXAM  - Hemoglobin A1C; Future  - CBC; Future  - Comprehensive Metabolic Panel; Future  - Lipid Panel; Future    2. Essential hypertension  Chronic. Well-controlled. Continue Norvasc, Diovan HCT, and metoprolol. Check labs  - CBC; Future  - Comprehensive Metabolic Panel; Future  - metoprolol succinate (TOPROL XL) 100 MG extended release tablet; Take 1 tablet by mouth daily  Dispense: 90 tablet; Refill: 3  - amLODIPine (NORVASC) 10 MG tablet; Take 1 tablet by mouth daily  Dispense: 90 tablet; Refill: 3    3. Other hyperlipidemia  Chronic. Check status of control with lipid panel. Continue Lipitor  - Lipid Panel; Future    4. Prostate cancer screening  - PSA Screening; Future    5.  Influenza vaccine needed  - Influenza, FLUAD, (age 72 y+), IM, Preservative Free, 0.5 mL        They voiced understanding. All questions answered. They agreed with treatment plan. See patient instructions for any educational materials that may have been given. Discussed use, benefit, and side effects of prescribed medications. Reviewed health maintenance. (Please note that portions of this note may have been completed with a voice recognition program.  Efforts were made to edit the dictation but occasionally words are mis-transcribed.)    Return in about 6 months (around 6/2/2023) for medicare wellness.       Electronically signed by Robson Bose MD on 12/2/2022 at 8:01 AM

## 2022-12-05 ENCOUNTER — TELEPHONE (OUTPATIENT)
Dept: FAMILY MEDICINE CLINIC | Age: 67
End: 2022-12-05

## 2022-12-05 DIAGNOSIS — E11.9 TYPE 2 DIABETES MELLITUS WITHOUT COMPLICATION, WITHOUT LONG-TERM CURRENT USE OF INSULIN (HCC): Primary | ICD-10-CM

## 2022-12-05 NOTE — TELEPHONE ENCOUNTER
----- Message from Jevon Steiner MD sent at 12/4/2022 11:09 AM EST -----  Psa is good. Cholesterol is good except elevated triglycerides which are better than previous. CMP and CBC are good. A1c is similar at 7.8%. recommend increasing metformin to 1000mg twice daily if he is agreeable. Will need new metformin rx. Please advise patient.   Jevon Steiner MD

## 2022-12-05 NOTE — TELEPHONE ENCOUNTER
Called and notified patient of lab results. He has verbalized an understanding and has agreed to increase Metformin. Please send new script to Tommy.

## 2023-06-19 ENCOUNTER — OFFICE VISIT (OUTPATIENT)
Dept: FAMILY MEDICINE CLINIC | Age: 68
End: 2023-06-19
Payer: MEDICARE

## 2023-06-19 ENCOUNTER — HOSPITAL ENCOUNTER (OUTPATIENT)
Age: 68
Discharge: HOME | End: 2023-06-19
Payer: MEDICARE

## 2023-06-19 VITALS
OXYGEN SATURATION: 98 % | SYSTOLIC BLOOD PRESSURE: 132 MMHG | HEIGHT: 72 IN | BODY MASS INDEX: 27.36 KG/M2 | WEIGHT: 202 LBS | TEMPERATURE: 98.1 F | HEART RATE: 62 BPM | RESPIRATION RATE: 18 BRPM | DIASTOLIC BLOOD PRESSURE: 78 MMHG

## 2023-06-19 DIAGNOSIS — E78.49 OTHER HYPERLIPIDEMIA: Chronic | ICD-10-CM

## 2023-06-19 DIAGNOSIS — I10 ESSENTIAL HYPERTENSION: ICD-10-CM

## 2023-06-19 DIAGNOSIS — E11.9 TYPE 2 DIABETES MELLITUS WITHOUT COMPLICATION, WITHOUT LONG-TERM CURRENT USE OF INSULIN (HCC): ICD-10-CM

## 2023-06-19 DIAGNOSIS — Z00.00 MEDICARE ANNUAL WELLNESS VISIT, SUBSEQUENT: Primary | ICD-10-CM

## 2023-06-19 LAB
ALBUMIN SERPL BCG-MCNC: 4.1 G/DL (ref 3.5–5.1)
ALP SERPL-CCNC: 77 U/L (ref 38–126)
ALT SERPL W/O P-5'-P-CCNC: 31 U/L (ref 11–66)
ANION GAP SERPL CALC-SCNC: 12 MEQ/L (ref 8–16)
AST SERPL-CCNC: 22 U/L (ref 5–40)
BILIRUB SERPL-MCNC: 0.4 MG/DL (ref 0.3–1.2)
BUN SERPL-MCNC: 23 MG/DL (ref 7–22)
CALCIUM SERPL-MCNC: 9.1 MG/DL (ref 8.5–10.5)
CHLORIDE SERPL-SCNC: 104 MEQ/L (ref 98–111)
CHOLEST SERPL-MCNC: 106 MG/DL (ref 100–199)
CO2 SERPL-SCNC: 27 MEQ/L (ref 23–33)
CREAT SERPL-MCNC: 1.2 MG/DL (ref 0.4–1.2)
DEPRECATED MEAN GLUCOSE BLD GHB EST-ACNC: 117 MG/DL (ref 70–126)
DEPRECATED RDW RBC AUTO: 44.7 FL (ref 35–45)
ERYTHROCYTE [DISTWIDTH] IN BLOOD BY AUTOMATED COUNT: 13.2 % (ref 11.5–14.5)
GFR SERPL CREATININE-BSD FRML MDRD: > 60 ML/MIN/1.73M2
GLUCOSE SERPL-MCNC: 108 MG/DL (ref 70–108)
HBA1C MFR BLD HPLC: 5.9 % (ref 4.4–6.4)
HCT VFR BLD AUTO: 44.1 % (ref 42–52)
HDLC SERPL-MCNC: 31 MG/DL
HGB BLD-MCNC: 14.4 GM/DL (ref 14–18)
LDLC SERPL CALC-MCNC: 50 MG/DL
MCH RBC QN AUTO: 30.7 PG (ref 26–33)
MCHC RBC AUTO-ENTMCNC: 32.7 GM/DL (ref 32.2–35.5)
MCV RBC AUTO: 94 FL (ref 80–94)
PLATELET # BLD AUTO: 211 THOU/MM3 (ref 130–400)
PMV BLD AUTO: 11.5 FL (ref 9.4–12.4)
POTASSIUM SERPL-SCNC: 3.2 MEQ/L (ref 3.5–5.2)
PROT SERPL-MCNC: 6.9 G/DL (ref 6.1–8)
RBC # BLD AUTO: 4.69 MILL/MM3 (ref 4.7–6.1)
SODIUM SERPL-SCNC: 143 MEQ/L (ref 135–145)
TRIGL SERPL-MCNC: 127 MG/DL (ref 0–199)
WBC # BLD AUTO: 7.6 THOU/MM3 (ref 4.8–10.8)

## 2023-06-19 PROCEDURE — G0439 PPPS, SUBSEQ VISIT: HCPCS | Performed by: FAMILY MEDICINE

## 2023-06-19 PROCEDURE — 3017F COLORECTAL CA SCREEN DOC REV: CPT | Performed by: FAMILY MEDICINE

## 2023-06-19 PROCEDURE — 80061 LIPID PANEL: CPT

## 2023-06-19 PROCEDURE — 3078F DIAST BP <80 MM HG: CPT | Performed by: FAMILY MEDICINE

## 2023-06-19 PROCEDURE — 3075F SYST BP GE 130 - 139MM HG: CPT | Performed by: FAMILY MEDICINE

## 2023-06-19 PROCEDURE — 83036 HEMOGLOBIN GLYCOSYLATED A1C: CPT

## 2023-06-19 PROCEDURE — 85027 COMPLETE CBC AUTOMATED: CPT

## 2023-06-19 PROCEDURE — 80053 COMPREHEN METABOLIC PANEL: CPT

## 2023-06-19 PROCEDURE — 3046F HEMOGLOBIN A1C LEVEL >9.0%: CPT | Performed by: FAMILY MEDICINE

## 2023-06-19 PROCEDURE — 36415 COLL VENOUS BLD VENIPUNCTURE: CPT

## 2023-06-19 PROCEDURE — 1123F ACP DISCUSS/DSCN MKR DOCD: CPT | Performed by: FAMILY MEDICINE

## 2023-06-19 RX ORDER — VALSARTAN AND HYDROCHLOROTHIAZIDE 160; 25 MG/1; MG/1
1 TABLET ORAL DAILY
Qty: 90 TABLET | Refills: 3 | Status: SHIPPED | OUTPATIENT
Start: 2023-06-19

## 2023-06-19 RX ORDER — ATORVASTATIN CALCIUM 40 MG/1
40 TABLET, FILM COATED ORAL DAILY
Qty: 90 TABLET | Refills: 3 | Status: SHIPPED | OUTPATIENT
Start: 2023-06-19

## 2023-06-19 SDOH — ECONOMIC STABILITY: HOUSING INSECURITY
IN THE LAST 12 MONTHS, WAS THERE A TIME WHEN YOU DID NOT HAVE A STEADY PLACE TO SLEEP OR SLEPT IN A SHELTER (INCLUDING NOW)?: NO

## 2023-06-19 SDOH — ECONOMIC STABILITY: FOOD INSECURITY: WITHIN THE PAST 12 MONTHS, YOU WORRIED THAT YOUR FOOD WOULD RUN OUT BEFORE YOU GOT MONEY TO BUY MORE.: NEVER TRUE

## 2023-06-19 SDOH — ECONOMIC STABILITY: FOOD INSECURITY: WITHIN THE PAST 12 MONTHS, THE FOOD YOU BOUGHT JUST DIDN'T LAST AND YOU DIDN'T HAVE MONEY TO GET MORE.: NEVER TRUE

## 2023-06-19 SDOH — ECONOMIC STABILITY: INCOME INSECURITY: HOW HARD IS IT FOR YOU TO PAY FOR THE VERY BASICS LIKE FOOD, HOUSING, MEDICAL CARE, AND HEATING?: NOT HARD AT ALL

## 2023-06-19 ASSESSMENT — PATIENT HEALTH QUESTIONNAIRE - PHQ9
SUM OF ALL RESPONSES TO PHQ QUESTIONS 1-9: 0
SUM OF ALL RESPONSES TO PHQ QUESTIONS 1-9: 0
1. LITTLE INTEREST OR PLEASURE IN DOING THINGS: 0
SUM OF ALL RESPONSES TO PHQ9 QUESTIONS 1 & 2: 0
SUM OF ALL RESPONSES TO PHQ QUESTIONS 1-9: 0
2. FEELING DOWN, DEPRESSED OR HOPELESS: 0
SUM OF ALL RESPONSES TO PHQ QUESTIONS 1-9: 0

## 2023-06-19 ASSESSMENT — LIFESTYLE VARIABLES
HOW MANY STANDARD DRINKS CONTAINING ALCOHOL DO YOU HAVE ON A TYPICAL DAY: 1 OR 2
HOW OFTEN DO YOU HAVE A DRINK CONTAINING ALCOHOL: 2-4 TIMES A MONTH

## 2023-06-19 NOTE — PROGRESS NOTES
Medicare Annual Wellness Visit    Joelle Delaney is here for Medicare AWV    Assessment & Plan   Medicare annual wellness visit, subsequent  Type 2 diabetes mellitus without complication, without long-term current use of insulin ([address]  -     Hemoglobin A1C; Future  -     CBC; Future  -     Comprehensive Metabolic Panel; Future  -     Lipid Panel; Future  -     metFORMIN (GLUCOPHAGE) 1000 MG tablet; Take 1 tablet by mouth 2 times daily (with meals), Disp-180 tablet, R-3Normal  -     atorvastatin (LIPITOR) 40 MG tablet; Take 1 tablet by mouth daily, Disp-90 tablet, R-3Normal  -     Microalbumin / Creatinine Urine Ratio; Future  Essential hypertension  -     CBC; Future  -     Comprehensive Metabolic Panel; Future  -     valsartan-hydroCHLOROthiazide (DIOVAN-HCT) 160-25 MG per tablet; Take 1 tablet by mouth daily, Disp-90 tablet, R-3Normal  Other hyperlipidemia  -     Lipid Panel; Future  -     atorvastatin (LIPITOR) 40 MG tablet; Take 1 tablet by mouth daily, Disp-90 tablet, R-3Normal      DM type 2:  chronic. Controlled. Diet, exercise. Metformin    Chronic problems as above. Check labs  Refill meds without changes. Declines hep C test    Recommendations for Preventive Services Due: see orders and patient instructions/AVS.  Recommended screening schedule for the next 5-10 years is provided to the patient in written form: see Patient Instructions/AVS.     Return in about 6 months (around 12/19/2023) for DM, HTN. Subjective       12 lb wt loss in 6 months. Glucose . He changed his diet. Diarrhea with the metformin so he sometimes skips evening metformin dose. Roberto works prn. Was in [facility] this last week    Patient's complete Health Risk Assessment and screening values have been reviewed and are found in Flowsheets. The following problems were reviewed today and where indicated follow up appointments were made and/or referrals ordered.     Positive Risk Factor Screenings with

## 2023-06-20 ENCOUNTER — TELEPHONE (OUTPATIENT)
Dept: FAMILY MEDICINE CLINIC | Age: 68
End: 2023-06-20

## 2023-06-20 DIAGNOSIS — E11.9 TYPE 2 DIABETES MELLITUS WITHOUT COMPLICATION, WITHOUT LONG-TERM CURRENT USE OF INSULIN (HCC): ICD-10-CM

## 2023-06-20 DIAGNOSIS — E87.6 HYPOKALEMIA: Primary | ICD-10-CM

## 2023-06-20 DIAGNOSIS — I10 ESSENTIAL HYPERTENSION: ICD-10-CM

## 2023-06-20 RX ORDER — POTASSIUM CHLORIDE 750 MG/1
10 TABLET, EXTENDED RELEASE ORAL DAILY
Qty: 90 TABLET | Refills: 1 | Status: SHIPPED | OUTPATIENT
Start: 2023-06-20

## 2023-06-20 NOTE — TELEPHONE ENCOUNTER
Minoo Medrano called requesting a refill on the following medications:  Requested Prescriptions     Pending Prescriptions Disp Refills    potassium chloride (KLOR-CON M) 10 MEQ extended release tablet 90 tablet 1     Sig: Take 1 tablet by mouth daily       Date of last visit: 6/19/2023  Date of next visit (if applicable):12/18/2023  Date of last refill:   Pharmacy Name: [address] [place] [address].    Rx pending #90/1  *pended CMP    Thanks,  Pradeep Frey LPN

## 2023-06-20 NOTE — TELEPHONE ENCOUNTER
----- Message from Ross Gutierrez MD sent at 6/19/2023  8:22 PM EDT -----  CBC is good. CMP is good except for potassium level just below range. Potassium level could be low from the hydrochlorothiazide. Recommend starting Klor-con 10 milliequivalents daily with recheck potassium level in 2 weeks. Cholesterol levels are good. A1c shows diabetes is well controlled at 5.9%. Please advise patient.   Ross Gutierrez MD

## 2023-07-10 ENCOUNTER — HOSPITAL ENCOUNTER (OUTPATIENT)
Age: 68
Discharge: HOME | End: 2023-07-10
Payer: MEDICARE

## 2023-07-10 DIAGNOSIS — E87.6 HYPOKALEMIA: ICD-10-CM

## 2023-07-10 DIAGNOSIS — E11.9 TYPE 2 DIABETES MELLITUS WITHOUT COMPLICATION, WITHOUT LONG-TERM CURRENT USE OF INSULIN (HCC): ICD-10-CM

## 2023-07-10 LAB
CREAT UR-MCNC: 62.8 MG/DL
MICROALBUMIN UR-MCNC: < 1.2 MG/DL
MICROALBUMIN/CREAT RATIO PNL UR: 19 MG/G (ref 0–30)
POTASSIUM SERPL-SCNC: 4.1 MEQ/L (ref 3.5–5.2)

## 2023-07-10 PROCEDURE — 36415 COLL VENOUS BLD VENIPUNCTURE: CPT

## 2023-07-10 PROCEDURE — 84132 ASSAY OF SERUM POTASSIUM: CPT

## 2023-07-10 PROCEDURE — 82043 UR ALBUMIN QUANTITATIVE: CPT

## 2023-07-11 ENCOUNTER — TELEPHONE (OUTPATIENT)
Dept: FAMILY MEDICINE CLINIC | Age: 68
End: 2023-07-11

## 2023-07-11 NOTE — TELEPHONE ENCOUNTER
----- Message from Saskia Pino MD sent at 7/10/2023  8:14 PM EDT -----  Urine test is good. Potassium level is good. Please advise patient.   Saskia Pino MD

## 2023-12-18 ENCOUNTER — HOSPITAL ENCOUNTER (OUTPATIENT)
Age: 68
Discharge: HOME | End: 2023-12-18
Payer: MEDICARE

## 2023-12-18 DIAGNOSIS — I10 ESSENTIAL HYPERTENSION: ICD-10-CM

## 2023-12-18 DIAGNOSIS — E78.49 OTHER HYPERLIPIDEMIA: ICD-10-CM

## 2023-12-18 DIAGNOSIS — E11.9 TYPE 2 DIABETES MELLITUS WITHOUT COMPLICATION, WITHOUT LONG-TERM CURRENT USE OF INSULIN (HCC): ICD-10-CM

## 2023-12-18 DIAGNOSIS — Z12.5 PROSTATE CANCER SCREENING: ICD-10-CM

## 2023-12-18 LAB
ALBUMIN SERPL BCG-MCNC: 4.5 G/DL (ref 3.5–5.1)
ALP SERPL-CCNC: 93 U/L (ref 38–126)
ALT SERPL W/O P-5'-P-CCNC: 21 U/L (ref 11–66)
ANION GAP SERPL CALC-SCNC: 12 MEQ/L (ref 8–16)
AST SERPL-CCNC: 18 U/L (ref 5–40)
BILIRUB SERPL-MCNC: 0.6 MG/DL (ref 0.3–1.2)
BUN SERPL-MCNC: 23 MG/DL (ref 7–22)
CALCIUM SERPL-MCNC: 10 MG/DL (ref 8.5–10.5)
CHLORIDE SERPL-SCNC: 99 MEQ/L (ref 98–111)
CHOLEST SERPL-MCNC: 130 MG/DL (ref 100–199)
CO2 SERPL-SCNC: 29 MEQ/L (ref 23–33)
CREAT SERPL-MCNC: 1.3 MG/DL (ref 0.4–1.2)
DEPRECATED MEAN GLUCOSE BLD GHB EST-ACNC: 117 MG/DL (ref 70–126)
DEPRECATED RDW RBC AUTO: 44.8 FL (ref 35–45)
ERYTHROCYTE [DISTWIDTH] IN BLOOD BY AUTOMATED COUNT: 13.2 % (ref 11.5–14.5)
GFR SERPL CREATININE-BSD FRML MDRD: 60 ML/MIN/1.73M2
GLUCOSE SERPL-MCNC: 108 MG/DL (ref 70–108)
HBA1C MFR BLD HPLC: 5.9 % (ref 4.4–6.4)
HCT VFR BLD AUTO: 44.5 % (ref 42–52)
HDLC SERPL-MCNC: 30 MG/DL
HGB BLD-MCNC: 14.7 GM/DL (ref 14–18)
LDLC SERPL CALC-MCNC: 62 MG/DL
MCH RBC QN AUTO: 30.5 PG (ref 26–33)
MCHC RBC AUTO-ENTMCNC: 33 GM/DL (ref 32.2–35.5)
MCV RBC AUTO: 92.3 FL (ref 80–94)
PLATELET # BLD AUTO: 250 THOU/MM3 (ref 130–400)
PMV BLD AUTO: 11.1 FL (ref 9.4–12.4)
POTASSIUM SERPL-SCNC: 3.7 MEQ/L (ref 3.5–5.2)
PROT SERPL-MCNC: 7.7 G/DL (ref 6.1–8)
PSA SERPL-MCNC: 3.06 NG/ML (ref 0–1)
RBC # BLD AUTO: 4.82 MILL/MM3 (ref 4.7–6.1)
SODIUM SERPL-SCNC: 140 MEQ/L (ref 135–145)
TRIGL SERPL-MCNC: 189 MG/DL (ref 0–199)
WBC # BLD AUTO: 6.4 THOU/MM3 (ref 4.8–10.8)

## 2023-12-18 PROCEDURE — 80061 LIPID PANEL: CPT

## 2023-12-18 PROCEDURE — 80053 COMPREHEN METABOLIC PANEL: CPT

## 2023-12-18 PROCEDURE — 85027 COMPLETE CBC AUTOMATED: CPT

## 2023-12-18 PROCEDURE — 83036 HEMOGLOBIN GLYCOSYLATED A1C: CPT

## 2023-12-18 PROCEDURE — G0103 PSA SCREENING: HCPCS

## 2023-12-18 PROCEDURE — 36415 COLL VENOUS BLD VENIPUNCTURE: CPT

## 2023-12-19 PROBLEM — R97.20 ELEVATED PSA: Status: ACTIVE | Noted: 2023-12-19

## 2024-01-02 ENCOUNTER — HOSPITAL ENCOUNTER (OUTPATIENT)
Age: 69
Discharge: HOME | End: 2024-01-02
Payer: MEDICARE

## 2024-01-02 DIAGNOSIS — R97.20 ELEVATED PSA: ICD-10-CM

## 2024-01-02 LAB — PSA SERPL-MCNC: 0.94 NG/ML (ref 0–1)

## 2024-01-02 PROCEDURE — 84153 ASSAY OF PSA TOTAL: CPT

## 2024-01-02 PROCEDURE — 36415 COLL VENOUS BLD VENIPUNCTURE: CPT

## 2024-01-03 ENCOUNTER — TELEPHONE (OUTPATIENT)
Dept: FAMILY MEDICINE CLINIC | Age: 69
End: 2024-01-03

## 2024-01-03 NOTE — TELEPHONE ENCOUNTER
----- Message from Patrick Waller MD sent at 1/2/2024  6:32 PM EST -----  PSA level is back to baseline after antibiotic.  Recheck PSA in about 1 year    Please advise patient.  Patrick Waller MD

## 2024-06-19 ENCOUNTER — HOSPITAL ENCOUNTER (OUTPATIENT)
Age: 69
Discharge: HOME | End: 2024-06-19
Payer: MEDICARE

## 2024-06-19 ENCOUNTER — OFFICE VISIT (OUTPATIENT)
Dept: FAMILY MEDICINE CLINIC | Age: 69
End: 2024-06-19
Payer: MEDICARE

## 2024-06-19 VITALS
BODY MASS INDEX: 27.5 KG/M2 | WEIGHT: 203 LBS | DIASTOLIC BLOOD PRESSURE: 80 MMHG | RESPIRATION RATE: 16 BRPM | HEIGHT: 72 IN | SYSTOLIC BLOOD PRESSURE: 130 MMHG | HEART RATE: 57 BPM

## 2024-06-19 DIAGNOSIS — I10 ESSENTIAL HYPERTENSION: ICD-10-CM

## 2024-06-19 DIAGNOSIS — E78.49 OTHER HYPERLIPIDEMIA: Chronic | ICD-10-CM

## 2024-06-19 DIAGNOSIS — Z00.00 MEDICARE ANNUAL WELLNESS VISIT, SUBSEQUENT: Primary | ICD-10-CM

## 2024-06-19 DIAGNOSIS — E11.9 TYPE 2 DIABETES MELLITUS WITHOUT COMPLICATION, WITHOUT LONG-TERM CURRENT USE OF INSULIN (HCC): ICD-10-CM

## 2024-06-19 LAB
ALBUMIN SERPL BCG-MCNC: 4.3 G/DL (ref 3.5–5.1)
ALP SERPL-CCNC: 82 U/L (ref 38–126)
ALT SERPL W/O P-5'-P-CCNC: 18 U/L (ref 11–66)
ANION GAP SERPL CALC-SCNC: 17 MEQ/L (ref 8–16)
AST SERPL-CCNC: 20 U/L (ref 5–40)
BILIRUB SERPL-MCNC: 0.6 MG/DL (ref 0.3–1.2)
BUN SERPL-MCNC: 27 MG/DL (ref 7–22)
CALCIUM SERPL-MCNC: 9.7 MG/DL (ref 8.5–10.5)
CHLORIDE SERPL-SCNC: 103 MEQ/L (ref 98–111)
CHOLEST SERPL-MCNC: 116 MG/DL (ref 100–199)
CO2 SERPL-SCNC: 24 MEQ/L (ref 23–33)
CREAT SERPL-MCNC: 1 MG/DL (ref 0.4–1.2)
CREAT UR-MCNC: 89.9 MG/DL
DEPRECATED MEAN GLUCOSE BLD GHB EST-ACNC: 120 MG/DL (ref 70–126)
GFR SERPL CREATININE-BSD FRML MDRD: 82 ML/MIN/1.73M2
GLUCOSE SERPL-MCNC: 103 MG/DL (ref 70–108)
HBA1C MFR BLD HPLC: 6 % (ref 4.4–6.4)
HDLC SERPL-MCNC: 29 MG/DL
LDLC SERPL CALC-MCNC: 60 MG/DL
MICROALBUMIN UR-MCNC: 2.96 MG/DL
MICROALBUMIN/CREAT RATIO PNL UR: 33 MG/G (ref 0–30)
POTASSIUM SERPL-SCNC: 3.6 MEQ/L (ref 3.5–5.2)
PROT SERPL-MCNC: 7.3 G/DL (ref 6.1–8)
SODIUM SERPL-SCNC: 144 MEQ/L (ref 135–145)
TRIGL SERPL-MCNC: 135 MG/DL (ref 0–199)

## 2024-06-19 PROCEDURE — 3017F COLORECTAL CA SCREEN DOC REV: CPT | Performed by: FAMILY MEDICINE

## 2024-06-19 PROCEDURE — 3046F HEMOGLOBIN A1C LEVEL >9.0%: CPT | Performed by: FAMILY MEDICINE

## 2024-06-19 PROCEDURE — 3079F DIAST BP 80-89 MM HG: CPT | Performed by: FAMILY MEDICINE

## 2024-06-19 PROCEDURE — 80053 COMPREHEN METABOLIC PANEL: CPT

## 2024-06-19 PROCEDURE — 36415 COLL VENOUS BLD VENIPUNCTURE: CPT

## 2024-06-19 PROCEDURE — 82043 UR ALBUMIN QUANTITATIVE: CPT

## 2024-06-19 PROCEDURE — 3075F SYST BP GE 130 - 139MM HG: CPT | Performed by: FAMILY MEDICINE

## 2024-06-19 PROCEDURE — 83036 HEMOGLOBIN GLYCOSYLATED A1C: CPT

## 2024-06-19 PROCEDURE — G0439 PPPS, SUBSEQ VISIT: HCPCS | Performed by: FAMILY MEDICINE

## 2024-06-19 PROCEDURE — 80061 LIPID PANEL: CPT

## 2024-06-19 PROCEDURE — 1123F ACP DISCUSS/DSCN MKR DOCD: CPT | Performed by: FAMILY MEDICINE

## 2024-06-19 RX ORDER — ATORVASTATIN CALCIUM 40 MG/1
40 TABLET, FILM COATED ORAL DAILY
Qty: 90 TABLET | Refills: 3 | Status: SHIPPED | OUTPATIENT
Start: 2024-06-19

## 2024-06-19 RX ORDER — VALSARTAN AND HYDROCHLOROTHIAZIDE 160; 25 MG/1; MG/1
1 TABLET ORAL DAILY
Qty: 90 TABLET | Refills: 3 | Status: SHIPPED | OUTPATIENT
Start: 2024-06-19

## 2024-06-19 SDOH — ECONOMIC STABILITY: INCOME INSECURITY: HOW HARD IS IT FOR YOU TO PAY FOR THE VERY BASICS LIKE FOOD, HOUSING, MEDICAL CARE, AND HEATING?: NOT HARD AT ALL

## 2024-06-19 SDOH — ECONOMIC STABILITY: FOOD INSECURITY: WITHIN THE PAST 12 MONTHS, THE FOOD YOU BOUGHT JUST DIDN'T LAST AND YOU DIDN'T HAVE MONEY TO GET MORE.: NEVER TRUE

## 2024-06-19 SDOH — ECONOMIC STABILITY: FOOD INSECURITY: WITHIN THE PAST 12 MONTHS, YOU WORRIED THAT YOUR FOOD WOULD RUN OUT BEFORE YOU GOT MONEY TO BUY MORE.: NEVER TRUE

## 2024-06-19 ASSESSMENT — LIFESTYLE VARIABLES
HOW OFTEN DO YOU HAVE A DRINK CONTAINING ALCOHOL: MONTHLY OR LESS
HOW MANY STANDARD DRINKS CONTAINING ALCOHOL DO YOU HAVE ON A TYPICAL DAY: 1 OR 2

## 2024-06-19 ASSESSMENT — PATIENT HEALTH QUESTIONNAIRE - PHQ9
1. LITTLE INTEREST OR PLEASURE IN DOING THINGS: NOT AT ALL
SUM OF ALL RESPONSES TO PHQ QUESTIONS 1-9: 0
SUM OF ALL RESPONSES TO PHQ QUESTIONS 1-9: 0
2. FEELING DOWN, DEPRESSED OR HOPELESS: NOT AT ALL
SUM OF ALL RESPONSES TO PHQ QUESTIONS 1-9: 0
SUM OF ALL RESPONSES TO PHQ9 QUESTIONS 1 & 2: 0
SUM OF ALL RESPONSES TO PHQ QUESTIONS 1-9: 0

## 2024-06-20 ENCOUNTER — TELEPHONE (OUTPATIENT)
Dept: FAMILY MEDICINE CLINIC | Age: 69
End: 2024-06-20

## 2024-06-20 NOTE — TELEPHONE ENCOUNTER
----- Message from Patrick Waller MD sent at 6/19/2024  3:59 PM EDT -----  CMP is good.  Urine test shows small amounts of protein in the urine.  A1c is 6.0% indicating diabetes is well-controlled.  Cholesterol levels are excellent    Please advise patient.  Patrick Waller MD

## 2024-11-26 DIAGNOSIS — E87.6 HYPOKALEMIA: ICD-10-CM

## 2024-11-26 RX ORDER — POTASSIUM CHLORIDE 750 MG/1
10 TABLET, EXTENDED RELEASE ORAL DAILY
Qty: 90 TABLET | Refills: 0 | Status: SHIPPED | OUTPATIENT
Start: 2024-11-26

## 2024-11-26 NOTE — TELEPHONE ENCOUNTER
Rayo Bullock Jr. called requesting a refill on the following medications:  Requested Prescriptions     Pending Prescriptions Disp Refills    potassium chloride (KLOR-CON M) 10 MEQ extended release tablet [Pharmacy Med Name: Potassium Chloride Stephy ER Oral Tablet Extended Release 10 MEQ] 90 tablet 0     Sig: TAKE 1 TABLET BY MOUTH EVERY DAY       Date of last visit: 6/19/2024  Date of next visit (if applicable):12/18/2024  Date of last refill: 12/18/2024-3  Pharmacy Name: [facility]

## 2024-12-18 ENCOUNTER — HOSPITAL ENCOUNTER (OUTPATIENT)
Age: 69
Discharge: HOME | End: 2024-12-18
Payer: MEDICARE

## 2024-12-18 ENCOUNTER — OFFICE VISIT (OUTPATIENT)
Dept: FAMILY MEDICINE CLINIC | Age: 69
End: 2024-12-18

## 2024-12-18 VITALS
WEIGHT: 205 LBS | DIASTOLIC BLOOD PRESSURE: 88 MMHG | HEART RATE: 59 BPM | SYSTOLIC BLOOD PRESSURE: 132 MMHG | RESPIRATION RATE: 16 BRPM | BODY MASS INDEX: 27.8 KG/M2 | OXYGEN SATURATION: 98 %

## 2024-12-18 DIAGNOSIS — Z12.5 PROSTATE CANCER SCREENING: ICD-10-CM

## 2024-12-18 DIAGNOSIS — E87.6 HYPOKALEMIA: ICD-10-CM

## 2024-12-18 DIAGNOSIS — E11.9 TYPE 2 DIABETES MELLITUS WITHOUT COMPLICATION, WITHOUT LONG-TERM CURRENT USE OF INSULIN (HCC): Primary | ICD-10-CM

## 2024-12-18 DIAGNOSIS — I10 ESSENTIAL HYPERTENSION: ICD-10-CM

## 2024-12-18 DIAGNOSIS — E78.49 OTHER HYPERLIPIDEMIA: ICD-10-CM

## 2024-12-18 DIAGNOSIS — Z23 INFLUENZA VACCINE NEEDED: ICD-10-CM

## 2024-12-18 DIAGNOSIS — Z12.11 ENCOUNTER FOR SCREENING COLONOSCOPY: ICD-10-CM

## 2024-12-18 DIAGNOSIS — E11.9 TYPE 2 DIABETES MELLITUS WITHOUT COMPLICATION, WITHOUT LONG-TERM CURRENT USE OF INSULIN (HCC): ICD-10-CM

## 2024-12-18 LAB
ALBUMIN SERPL BCG-MCNC: 4.4 G/DL (ref 3.5–5.1)
ALP SERPL-CCNC: 84 U/L (ref 38–126)
ALT SERPL W/O P-5'-P-CCNC: 29 U/L (ref 11–66)
ANION GAP SERPL CALC-SCNC: 20 MEQ/L (ref 8–16)
AST SERPL-CCNC: 21 U/L (ref 5–40)
BASOPHILS ABSOLUTE: 0 THOU/MM3 (ref 0–0.1)
BASOPHILS NFR BLD AUTO: 0.4 %
BILIRUB SERPL-MCNC: 0.5 MG/DL (ref 0.3–1.2)
BUN SERPL-MCNC: 27 MG/DL (ref 7–22)
CALCIUM SERPL-MCNC: 9.8 MG/DL (ref 8.5–10.5)
CHLORIDE SERPL-SCNC: 102 MEQ/L (ref 98–111)
CHOLEST SERPL-MCNC: 108 MG/DL (ref 100–199)
CO2 SERPL-SCNC: 22 MEQ/L (ref 23–33)
CREAT SERPL-MCNC: 1.2 MG/DL (ref 0.4–1.2)
DEPRECATED MEAN GLUCOSE BLD GHB EST-ACNC: 132 MG/DL (ref 70–126)
DEPRECATED RDW RBC AUTO: 43.7 FL (ref 35–45)
EOSINOPHIL NFR BLD AUTO: 2.2 %
EOSINOPHILS ABSOLUTE: 0.2 THOU/MM3 (ref 0–0.4)
ERYTHROCYTE [DISTWIDTH] IN BLOOD BY AUTOMATED COUNT: 13.1 % (ref 11.5–14.5)
FOLATE SERPL-MCNC: 16 NG/ML (ref 4.8–24.2)
GFR SERPL CREATININE-BSD FRML MDRD: 65 ML/MIN/1.73M2
GLUCOSE SERPL-MCNC: 104 MG/DL (ref 70–108)
HBA1C MFR BLD HPLC: 6.4 % (ref 4.4–6.4)
HCT VFR BLD AUTO: 42.6 % (ref 42–52)
HDLC SERPL-MCNC: 28 MG/DL
HGB BLD-MCNC: 14.4 GM/DL (ref 14–18)
IMM GRANULOCYTES # BLD AUTO: 0.02 THOU/MM3 (ref 0–0.07)
IMM GRANULOCYTES NFR BLD AUTO: 0.3 %
LDLC SERPL CALC-MCNC: 39 MG/DL
LYMPHOCYTES ABSOLUTE: 1.7 THOU/MM3 (ref 1–4.8)
LYMPHOCYTES NFR BLD AUTO: 24.3 %
MCH RBC QN AUTO: 31 PG (ref 26–33)
MCHC RBC AUTO-ENTMCNC: 33.8 GM/DL (ref 32.2–35.5)
MCV RBC AUTO: 91.8 FL (ref 80–94)
MONOCYTES ABSOLUTE: 0.6 THOU/MM3 (ref 0.4–1.3)
MONOCYTES NFR BLD AUTO: 9.1 %
NEUTROPHILS ABSOLUTE: 4.5 THOU/MM3 (ref 1.8–7.7)
NEUTROPHILS NFR BLD AUTO: 63.7 %
NRBC BLD AUTO-RTO: 0 /100 WBC
PLATELET # BLD AUTO: 239 THOU/MM3 (ref 130–400)
PMV BLD AUTO: 11.3 FL (ref 9.4–12.4)
POTASSIUM SERPL-SCNC: 3.9 MEQ/L (ref 3.5–5.2)
PROT SERPL-MCNC: 7.4 G/DL (ref 6.1–8)
PSA SERPL-MCNC: 0.77 NG/ML (ref 0–1)
RBC # BLD AUTO: 4.64 MILL/MM3 (ref 4.7–6.1)
SODIUM SERPL-SCNC: 144 MEQ/L (ref 135–145)
T4 FREE SERPL-MCNC: 1.47 NG/DL (ref 0.93–1.68)
TRIGL SERPL-MCNC: 205 MG/DL (ref 0–199)
TSH SERPL DL<=0.005 MIU/L-ACNC: 3.46 UIU/ML (ref 0.4–4.2)
VIT B12 SERPL-MCNC: 394 PG/ML (ref 211–911)
WBC # BLD AUTO: 7.1 THOU/MM3 (ref 4.8–10.8)

## 2024-12-18 PROCEDURE — 80061 LIPID PANEL: CPT

## 2024-12-18 PROCEDURE — 85025 COMPLETE CBC W/AUTO DIFF WBC: CPT

## 2024-12-18 PROCEDURE — 84439 ASSAY OF FREE THYROXINE: CPT

## 2024-12-18 PROCEDURE — 83036 HEMOGLOBIN GLYCOSYLATED A1C: CPT

## 2024-12-18 PROCEDURE — 82607 VITAMIN B-12: CPT

## 2024-12-18 PROCEDURE — G0103 PSA SCREENING: HCPCS

## 2024-12-18 PROCEDURE — 84443 ASSAY THYROID STIM HORMONE: CPT

## 2024-12-18 PROCEDURE — 82746 ASSAY OF FOLIC ACID SERUM: CPT

## 2024-12-18 PROCEDURE — 80053 COMPREHEN METABOLIC PANEL: CPT

## 2024-12-18 PROCEDURE — 36415 COLL VENOUS BLD VENIPUNCTURE: CPT

## 2024-12-18 RX ORDER — METOPROLOL SUCCINATE 100 MG/1
100 TABLET, EXTENDED RELEASE ORAL DAILY
Qty: 90 TABLET | Refills: 3 | Status: SHIPPED | OUTPATIENT
Start: 2024-12-18

## 2024-12-18 RX ORDER — AMLODIPINE BESYLATE 10 MG/1
10 TABLET ORAL DAILY
Qty: 90 TABLET | Refills: 3 | Status: SHIPPED | OUTPATIENT
Start: 2024-12-18

## 2024-12-18 RX ORDER — POTASSIUM CHLORIDE 750 MG/1
10 TABLET, EXTENDED RELEASE ORAL DAILY
Qty: 90 TABLET | Refills: 3 | Status: SHIPPED | OUTPATIENT
Start: 2024-12-18

## 2024-12-18 ASSESSMENT — ENCOUNTER SYMPTOMS
WHEEZING: 0
SHORTNESS OF BREATH: 0

## 2024-12-18 NOTE — PROGRESS NOTES
[facility] PROFESSIONAL [facility] FAMILY MEDICINE  [address]  [facility] [address]  Dept: [phone]  Dept Fax: [phone]  Loc: [phone]  PROGRESS NOTE      Visit Date: 12/18/2024    Rayo Bullock Jr. is a 69 y.o. male who presents today for:  Chief Complaint   Patient presents with    Diabetes     6 month follow up       Chief complaint:  DM    Subjective:  Diabetes  Pertinent negatives for diabetes include no chest pain.       6 month f/u  DM:  type 2. on metformin 1000 twice daily.  A1c was 6.0% in June.   Glucose      HTN:  on metoprolol, norvasc, and diovan HCT.  Home SBP 130s and DBP 80s.      Hyperlipidemia.  On lipitor. LDL 60 in June.     Review of Systems   Respiratory:  Negative for shortness of breath and wheezing.    Cardiovascular:  Negative for chest pain.   Neurological:  Negative for syncope and light-headedness.     Patient Active Problem List   Diagnosis    Type II or unspecified type diabetes mellitus without mention of complication, not stated as uncontrolled    Gout    Hypertension    Hyperlipidemia    Elevated PSA     Past Medical History:   Diagnosis Date    Diabetes mellitus (HCC)     Gout     Hyperlipidemia     Hypertension     Type II or unspecified type diabetes mellitus without mention of complication, not stated as uncontrolled       Past Surgical History:   Procedure Laterality Date    COLONOSCOPY      COLONOSCOPY Left 11/5/2019    COLONOSCOPY POLYPECTOMY SNARE/COLD BIOPSY performed by Martin Guevara MD at [facility]    HERNIA REPAIR      10 yrs ago    WISDOM TOOTH EXTRACTION       Family History   Problem Relation Age of Onset    Stroke Mother     High Blood Pressure Mother     Cancer Father      Social History     Tobacco Use    Smoking status: Former     Current packs/day: 0.00     Average packs/day: 2.0 packs/day for 20.0 years (40.0 ttl pk-yrs)     Types: Cigarettes     Start date: 7/9/1978     Quit date: 7/9/1998

## 2024-12-19 ENCOUNTER — TELEPHONE (OUTPATIENT)
Dept: FAMILY MEDICINE CLINIC | Age: 69
End: 2024-12-19

## 2024-12-19 NOTE — TELEPHONE ENCOUNTER
----- Message from Dr. Patrick Waller MD sent at 12/19/2024  9:12 AM EST -----  A1c is 6.4% indicating diabetes is well-controlled.  CMP is good except for mildly elevated anion gap which could indicate some acidosis (can be related to metformin).  Recommend continuing metformin.  Recommend improving hydration.  Recommend recheck BMP in 2 weeks: Order will need placed.  -Cholesterol levels show elevated triglycerides with excellent LDL.  CBC is good.  Thyroid tests are normal.  Normal PSA level.  B12 and folate level are good    Please advise patient.  Patrick Waller MD

## 2025-02-11 ENCOUNTER — TELEPHONE (OUTPATIENT)
Dept: FAMILY MEDICINE CLINIC | Age: 70
End: 2025-02-11

## 2025-02-11 DIAGNOSIS — E87.29 INCREASED ANION GAP METABOLIC ACIDOSIS: Primary | ICD-10-CM

## 2025-02-19 ENCOUNTER — HOSPITAL ENCOUNTER (OUTPATIENT)
Age: 70
Discharge: HOME | End: 2025-02-19
Payer: MEDICARE

## 2025-02-19 DIAGNOSIS — E87.29 INCREASED ANION GAP METABOLIC ACIDOSIS: ICD-10-CM

## 2025-02-19 PROCEDURE — 80048 BASIC METABOLIC PNL TOTAL CA: CPT

## 2025-02-19 PROCEDURE — 36415 COLL VENOUS BLD VENIPUNCTURE: CPT

## 2025-02-20 ENCOUNTER — TELEPHONE (OUTPATIENT)
Dept: FAMILY MEDICINE CLINIC | Age: 70
End: 2025-02-20

## 2025-02-20 LAB
ANION GAP SERPL CALC-SCNC: 15 MEQ/L (ref 8–16)
BUN SERPL-MCNC: 20 MG/DL (ref 7–22)
CALCIUM SERPL-MCNC: 9.5 MG/DL (ref 8.2–9.6)
CHLORIDE SERPL-SCNC: 98 MEQ/L (ref 98–111)
CO2 SERPL-SCNC: 27 MEQ/L (ref 23–33)
CREAT SERPL-MCNC: 1.4 MG/DL (ref 0.4–1.2)
GFR SERPL CREATININE-BSD FRML MDRD: 54 ML/MIN/1.73M2
GLUCOSE SERPL-MCNC: 172 MG/DL (ref 70–108)
POTASSIUM SERPL-SCNC: 3.8 MEQ/L (ref 3.5–5.2)
SODIUM SERPL-SCNC: 140 MEQ/L (ref 135–145)

## 2025-06-23 ENCOUNTER — HOSPITAL ENCOUNTER (OUTPATIENT)
Age: 70
Discharge: HOME | End: 2025-06-23
Payer: MEDICARE

## 2025-06-23 ENCOUNTER — RESULTS FOLLOW-UP (OUTPATIENT)
Dept: FAMILY MEDICINE CLINIC | Age: 70
End: 2025-06-23

## 2025-06-23 ENCOUNTER — OFFICE VISIT (OUTPATIENT)
Dept: FAMILY MEDICINE CLINIC | Age: 70
End: 2025-06-23
Payer: MEDICARE

## 2025-06-23 VITALS
HEIGHT: 72 IN | SYSTOLIC BLOOD PRESSURE: 118 MMHG | WEIGHT: 206 LBS | HEART RATE: 66 BPM | OXYGEN SATURATION: 95 % | BODY MASS INDEX: 27.9 KG/M2 | DIASTOLIC BLOOD PRESSURE: 74 MMHG

## 2025-06-23 DIAGNOSIS — Z00.00 MEDICARE ANNUAL WELLNESS VISIT, SUBSEQUENT: Primary | ICD-10-CM

## 2025-06-23 DIAGNOSIS — E78.49 OTHER HYPERLIPIDEMIA: ICD-10-CM

## 2025-06-23 DIAGNOSIS — I10 ESSENTIAL HYPERTENSION: ICD-10-CM

## 2025-06-23 DIAGNOSIS — E87.6 HYPOKALEMIA: ICD-10-CM

## 2025-06-23 DIAGNOSIS — E11.9 TYPE 2 DIABETES MELLITUS WITHOUT COMPLICATION, WITHOUT LONG-TERM CURRENT USE OF INSULIN (HCC): ICD-10-CM

## 2025-06-23 DIAGNOSIS — N18.31 CKD STAGE 3A, GFR 45-59 ML/MIN (HCC): Primary | ICD-10-CM

## 2025-06-23 LAB
ALBUMIN SERPL BCG-MCNC: 4.1 G/DL (ref 3.4–4.9)
ALP SERPL-CCNC: 77 U/L (ref 40–129)
ALT SERPL W/O P-5'-P-CCNC: 36 U/L (ref 10–50)
ANION GAP SERPL CALC-SCNC: 15 MEQ/L (ref 8–16)
AST SERPL-CCNC: 21 U/L (ref 10–50)
BASOPHILS ABSOLUTE: 0 THOU/MM3 (ref 0–0.1)
BASOPHILS NFR BLD AUTO: 0.5 %
BILIRUB SERPL-MCNC: 0.5 MG/DL (ref 0.3–1.2)
BUN SERPL-MCNC: 29 MG/DL (ref 8–23)
CALCIUM SERPL-MCNC: 9.6 MG/DL (ref 8.8–10.2)
CHLORIDE SERPL-SCNC: 103 MEQ/L (ref 98–111)
CHOLEST SERPL-MCNC: 113 MG/DL (ref 100–199)
CO2 SERPL-SCNC: 23 MEQ/L (ref 22–29)
CREAT SERPL-MCNC: 1.4 MG/DL (ref 0.7–1.2)
CREAT UR-MCNC: 97.3 MG/DL
DEPRECATED MEAN GLUCOSE BLD GHB EST-ACNC: 144 MG/DL (ref 70–126)
DEPRECATED RDW RBC AUTO: 45.8 FL (ref 35–45)
EOSINOPHIL NFR BLD AUTO: 2.1 %
EOSINOPHILS ABSOLUTE: 0.2 THOU/MM3 (ref 0–0.4)
ERYTHROCYTE [DISTWIDTH] IN BLOOD BY AUTOMATED COUNT: 13.6 % (ref 11.5–14.5)
GFR SERPL CREATININE-BSD FRML MDRD: 54 ML/MIN/1.73M2
GLUCOSE SERPL-MCNC: 108 MG/DL (ref 74–109)
HBA1C MFR BLD HPLC: 6.8 % (ref 4–6)
HCT VFR BLD AUTO: 43 % (ref 42–52)
HDLC SERPL-MCNC: 22 MG/DL
HGB BLD-MCNC: 14.4 GM/DL (ref 14–18)
IMM GRANULOCYTES # BLD AUTO: 0.02 THOU/MM3 (ref 0–0.07)
IMM GRANULOCYTES NFR BLD AUTO: 0.3 %
LDLC SERPL CALC-MCNC: 27 MG/DL
LYMPHOCYTES ABSOLUTE: 1.9 THOU/MM3 (ref 1–4.8)
LYMPHOCYTES NFR BLD AUTO: 24.9 %
MCH RBC QN AUTO: 30.8 PG (ref 26–33)
MCHC RBC AUTO-ENTMCNC: 33.5 GM/DL (ref 32.2–35.5)
MCV RBC AUTO: 91.9 FL (ref 80–94)
MICROALBUMIN UR-MCNC: 6.38 MG/DL
MICROALBUMIN/CREAT RATIO PNL UR: 66 MG/G (ref 0–30)
MONOCYTES ABSOLUTE: 0.7 THOU/MM3 (ref 0.4–1.3)
MONOCYTES NFR BLD AUTO: 9.3 %
NEUTROPHILS ABSOLUTE: 4.8 THOU/MM3 (ref 1.8–7.7)
NEUTROPHILS NFR BLD AUTO: 62.9 %
NRBC BLD AUTO-RTO: 0 /100 WBC
PLATELET # BLD AUTO: 238 THOU/MM3 (ref 130–400)
PMV BLD AUTO: 10.9 FL (ref 9.4–12.4)
POTASSIUM SERPL-SCNC: 3.3 MEQ/L (ref 3.5–5.2)
PROT SERPL-MCNC: 6.8 G/DL (ref 6.4–8.3)
RBC # BLD AUTO: 4.68 MILL/MM3 (ref 4.7–6.1)
SODIUM SERPL-SCNC: 141 MEQ/L (ref 135–145)
TRIGL SERPL-MCNC: 318 MG/DL (ref 0–199)
WBC # BLD AUTO: 7.6 THOU/MM3 (ref 4.8–10.8)

## 2025-06-23 PROCEDURE — 83036 HEMOGLOBIN GLYCOSYLATED A1C: CPT

## 2025-06-23 PROCEDURE — 80053 COMPREHEN METABOLIC PANEL: CPT

## 2025-06-23 PROCEDURE — 1159F MED LIST DOCD IN RCRD: CPT | Performed by: FAMILY MEDICINE

## 2025-06-23 PROCEDURE — 3017F COLORECTAL CA SCREEN DOC REV: CPT | Performed by: FAMILY MEDICINE

## 2025-06-23 PROCEDURE — 82043 UR ALBUMIN QUANTITATIVE: CPT

## 2025-06-23 PROCEDURE — 36415 COLL VENOUS BLD VENIPUNCTURE: CPT

## 2025-06-23 PROCEDURE — 3074F SYST BP LT 130 MM HG: CPT | Performed by: FAMILY MEDICINE

## 2025-06-23 PROCEDURE — 3078F DIAST BP <80 MM HG: CPT | Performed by: FAMILY MEDICINE

## 2025-06-23 PROCEDURE — 3046F HEMOGLOBIN A1C LEVEL >9.0%: CPT | Performed by: FAMILY MEDICINE

## 2025-06-23 PROCEDURE — 85025 COMPLETE CBC W/AUTO DIFF WBC: CPT

## 2025-06-23 PROCEDURE — 1160F RVW MEDS BY RX/DR IN RCRD: CPT | Performed by: FAMILY MEDICINE

## 2025-06-23 PROCEDURE — 1123F ACP DISCUSS/DSCN MKR DOCD: CPT | Performed by: FAMILY MEDICINE

## 2025-06-23 PROCEDURE — G0439 PPPS, SUBSEQ VISIT: HCPCS | Performed by: FAMILY MEDICINE

## 2025-06-23 PROCEDURE — 80061 LIPID PANEL: CPT

## 2025-06-23 RX ORDER — POTASSIUM CHLORIDE 1125 MG/1
15 TABLET, EXTENDED RELEASE ORAL DAILY
Qty: 90 TABLET | Refills: 3 | Status: SHIPPED | OUTPATIENT
Start: 2025-06-23

## 2025-06-23 RX ORDER — PREDNISOLONE ACETATE 10 MG/ML
SUSPENSION/ DROPS OPHTHALMIC
COMMUNITY
Start: 2025-05-08

## 2025-06-23 RX ORDER — VALSARTAN AND HYDROCHLOROTHIAZIDE 160; 25 MG/1; MG/1
1 TABLET ORAL DAILY
Qty: 90 TABLET | Refills: 3 | Status: SHIPPED | OUTPATIENT
Start: 2025-06-23

## 2025-06-23 RX ORDER — ATORVASTATIN CALCIUM 40 MG/1
40 TABLET, FILM COATED ORAL DAILY
Qty: 90 TABLET | Refills: 3 | Status: SHIPPED | OUTPATIENT
Start: 2025-06-23

## 2025-06-23 SDOH — ECONOMIC STABILITY: FOOD INSECURITY: WITHIN THE PAST 12 MONTHS, THE FOOD YOU BOUGHT JUST DIDN'T LAST AND YOU DIDN'T HAVE MONEY TO GET MORE.: NEVER TRUE

## 2025-06-23 SDOH — ECONOMIC STABILITY: FOOD INSECURITY: WITHIN THE PAST 12 MONTHS, YOU WORRIED THAT YOUR FOOD WOULD RUN OUT BEFORE YOU GOT MONEY TO BUY MORE.: NEVER TRUE

## 2025-06-23 ASSESSMENT — PATIENT HEALTH QUESTIONNAIRE - PHQ9
SUM OF ALL RESPONSES TO PHQ QUESTIONS 1-9: 0
2. FEELING DOWN, DEPRESSED OR HOPELESS: NOT AT ALL
SUM OF ALL RESPONSES TO PHQ QUESTIONS 1-9: 0
1. LITTLE INTEREST OR PLEASURE IN DOING THINGS: NOT AT ALL

## 2025-06-23 ASSESSMENT — LIFESTYLE VARIABLES
HOW OFTEN DO YOU HAVE A DRINK CONTAINING ALCOHOL: NEVER
HOW MANY STANDARD DRINKS CONTAINING ALCOHOL DO YOU HAVE ON A TYPICAL DAY: PATIENT DOES NOT DRINK

## 2025-06-23 NOTE — PROGRESS NOTES
Medicare Annual Wellness Visit    Rayo Bullock JrMartina is here for Medicare AWV    Assessment & Plan   Medicare annual wellness visit, subsequent  Type 2 diabetes mellitus without complication, without long-term current use of insulin (HCC)  -     Hemoglobin A1C; Future  -     Comprehensive Metabolic Panel; Future  -     Lipid Panel; Future  -     Albumin/Creatinine Ratio, Urine; Future  -     atorvastatin (LIPITOR) 40 MG tablet; Take 1 tablet by mouth daily, Disp-90 tablet, R-3Normal  Essential hypertension  -     CBC with Auto Differential; Future  -     valsartan-hydroCHLOROthiazide (DIOVAN-HCT) 160-25 MG per tablet; Take 1 tablet by mouth daily, Disp-90 tablet, R-3Normal  Other hyperlipidemia  -     atorvastatin (LIPITOR) 40 MG tablet; Take 1 tablet by mouth daily, Disp-90 tablet, R-3Normal         Type 2 diabetes mellitus: Chronic.  Check status of control with A1c.  Continue metformin.  Likely    Check labs and refill medications as above.    Return in about 6 months (around 12/23/2025) for DM.     Subjective       DM.  Glucose 115 this morning.  Metformin.     Had colonoscopy earlier this year    Patient's complete Health Risk Assessment and screening values have been reviewed and are found in Flowsheets. The following problems were reviewed today and where indicated follow up appointments were made and/or referrals ordered.    Positive Risk Factor Screenings with Interventions:              Inactivity:  On average, how many days per week do you engage in moderate to strenuous exercise (like a brisk walk)?: 0 days (!) Abnormal  On average, how many minutes do you engage in exercise at this level?: 0 min    Interventions:  Patient declined any further interventions or treatment                         Objective   Vitals:    06/23/25 0745   BP: 118/74   BP Site: Left Upper Arm   Patient Position: Sitting   Pulse: 66   SpO2: 95%   Weight: 93.4 kg (206 lb)   Height: 1.829 m (6')      Body mass index is 27.94

## 2025-06-23 NOTE — PATIENT INSTRUCTIONS
Learning About Being Active as an Older Adult  Why is being active important as you get older?     Being active is one of the best things you can do for your health. And it's never too late to start. Being active--or getting active, if you aren't already--has definite benefits. It can:  Give you more energy,  Keep your mind sharp.  Improve balance to reduce your risk of falls.  Help you manage chronic illness with fewer medicines.  No matter how old you are, how fit you are, or what health problems you have, there is a form of activity that will work for you. And the more physical activity you can do, the better your overall health will be.  What kinds of activity can help you stay healthy?  Being more active will make your daily activities easier. Physical activity includes planned exercise and things you do in daily life. There are four types of activity:  Aerobic.  Doing aerobic activity makes your heart and lungs strong.  Includes walking, dancing, and gardening.  Aim for at least 2½ hours spread throughout the week.  It improves your energy and can help you sleep better.  Muscle-strengthening.  This type of activity can help maintain muscle and strengthen bones.  Includes climbing stairs, using resistance bands, and lifting or carrying heavy loads.  Aim for at least twice a week.  It can help protect the knees and other joints.  Stretching.  Stretching gives you better range of motion in joints and muscles.  Includes upper arm stretches, calf stretches, and gentle yoga.  Aim for at least twice a week, preferably after your muscles are warmed up from other activities.  It can help you function better in daily life.  Balancing.  This helps you stay coordinated and have good posture.  Includes heel-to-toe walking, farrukh chi, and certain types of yoga.  Aim for at least 3 days a week.  It can reduce your risk of falling.  Even if you have a hard time meeting the recommendations, it's better to be more active

## 2025-07-18 ENCOUNTER — RESULTS FOLLOW-UP (OUTPATIENT)
Dept: FAMILY MEDICINE CLINIC | Age: 70
End: 2025-07-18

## 2025-07-18 ENCOUNTER — HOSPITAL ENCOUNTER (OUTPATIENT)
Age: 70
Discharge: HOME | End: 2025-07-18
Payer: MEDICARE

## 2025-07-18 DIAGNOSIS — E87.6 HYPOKALEMIA: ICD-10-CM

## 2025-07-18 DIAGNOSIS — N18.31 CKD STAGE 3A, GFR 45-59 ML/MIN (HCC): ICD-10-CM

## 2025-07-18 LAB
ANION GAP SERPL CALC-SCNC: 15 MEQ/L (ref 8–16)
BUN SERPL-MCNC: 23 MG/DL (ref 8–23)
CALCIUM SERPL-MCNC: 9.6 MG/DL (ref 8.8–10.2)
CHLORIDE SERPL-SCNC: 101 MEQ/L (ref 98–111)
CO2 SERPL-SCNC: 24 MEQ/L (ref 22–29)
CREAT SERPL-MCNC: 1.3 MG/DL (ref 0.7–1.2)
GFR SERPL CREATININE-BSD FRML MDRD: 59 ML/MIN/1.73M2
GLUCOSE SERPL-MCNC: 142 MG/DL (ref 74–109)
POTASSIUM SERPL-SCNC: 3.9 MEQ/L (ref 3.5–5.2)
SODIUM SERPL-SCNC: 140 MEQ/L (ref 135–145)

## 2025-07-18 PROCEDURE — 36415 COLL VENOUS BLD VENIPUNCTURE: CPT

## 2025-07-18 PROCEDURE — 80048 BASIC METABOLIC PNL TOTAL CA: CPT

## 2025-08-02 DIAGNOSIS — E11.9 TYPE 2 DIABETES MELLITUS WITHOUT COMPLICATION, WITHOUT LONG-TERM CURRENT USE OF INSULIN (HCC): ICD-10-CM

## (undated) DEVICE — TUBING IV STOPCOCK 48 CM 3 W

## (undated) DEVICE — SET ADMIN 25ML L117IN PMP MOD CK VLV RLER CLMP 2 SMRTSITE

## (undated) DEVICE — SOLUTION IV IRRIG WATER 500ML POUR BRL ST 2F7113

## (undated) DEVICE — CATHETER ETER IV 22GA L1IN POLYUR STR RADPQ INTROCAN SFTY

## (undated) DEVICE — IV START KIT: Brand: MEDLINE INDUSTRIES, INC.

## (undated) DEVICE — KIT INF CTRL 2OZ LUB TBNG L12FT DBL END BRSH SYR OP4

## (undated) DEVICE — SET LNR RED GRN W/ BASE CLEANASCOPE

## (undated) DEVICE — SOLUTION IV 1000ML 0.45% SOD CHL PH 5 INJ USP VIAFLX PLAS